# Patient Record
Sex: MALE | Race: WHITE | Employment: PART TIME | URBAN - METROPOLITAN AREA
[De-identification: names, ages, dates, MRNs, and addresses within clinical notes are randomized per-mention and may not be internally consistent; named-entity substitution may affect disease eponyms.]

---

## 2017-06-20 LAB
CREATININE, EXTERNAL: NORMAL
HBA1C MFR BLD HPLC: NORMAL %
LDL-C, EXTERNAL: NORMAL
MICROALBUMIN UR TEST STR-MCNC: 5.4 MG/DL

## 2017-08-18 ENCOUNTER — OFFICE VISIT (OUTPATIENT)
Dept: ENDOCRINOLOGY | Age: 57
End: 2017-08-18

## 2017-08-18 VITALS
BODY MASS INDEX: 33.78 KG/M2 | DIASTOLIC BLOOD PRESSURE: 86 MMHG | TEMPERATURE: 97.9 F | HEART RATE: 61 BPM | HEIGHT: 72 IN | SYSTOLIC BLOOD PRESSURE: 145 MMHG | WEIGHT: 249.4 LBS | RESPIRATION RATE: 16 BRPM | OXYGEN SATURATION: 96 %

## 2017-08-18 DIAGNOSIS — E03.9 HYPOTHYROIDISM, UNSPECIFIED TYPE: Primary | ICD-10-CM

## 2017-08-18 DIAGNOSIS — E11.9 TYPE 2 DIABETES MELLITUS WITHOUT COMPLICATION, WITHOUT LONG-TERM CURRENT USE OF INSULIN (HCC): ICD-10-CM

## 2017-08-18 DIAGNOSIS — E78.5 HYPERLIPIDEMIA, UNSPECIFIED HYPERLIPIDEMIA TYPE: ICD-10-CM

## 2017-08-18 RX ORDER — GLIPIZIDE 10 MG/1
10 TABLET ORAL
Qty: 180 TAB | Refills: 3 | Status: SHIPPED | OUTPATIENT
Start: 2017-08-18 | End: 2018-07-20 | Stop reason: SDUPTHER

## 2017-08-18 RX ORDER — LEVOTHYROXINE SODIUM 175 UG/1
175 TABLET ORAL
COMMUNITY
End: 2017-08-18 | Stop reason: SDUPTHER

## 2017-08-18 RX ORDER — METFORMIN HYDROCHLORIDE 1000 MG/1
1000 TABLET ORAL 2 TIMES DAILY WITH MEALS
COMMUNITY

## 2017-08-18 RX ORDER — LISINOPRIL AND HYDROCHLOROTHIAZIDE 12.5; 2 MG/1; MG/1
1 TABLET ORAL DAILY
COMMUNITY
End: 2021-10-01

## 2017-08-18 RX ORDER — ALBUTEROL SULFATE 90 UG/1
2 AEROSOL, METERED RESPIRATORY (INHALATION)
COMMUNITY
End: 2021-10-01

## 2017-08-18 RX ORDER — LEVOTHYROXINE SODIUM 150 UG/1
150 TABLET ORAL
Qty: 90 TAB | Refills: 3 | Status: SHIPPED | OUTPATIENT
Start: 2017-08-18 | End: 2018-06-24 | Stop reason: SDUPTHER

## 2017-08-18 NOTE — PATIENT INSTRUCTIONS
Taking Victoza:  Start by taking 0.6mg once daily for 1 week,   Then 1.2mg daily for 1 week,   Then 1.8mg daily thereafter. You can go ahead and increase to the 1.2 mg daily dose. We changed glyburide to glipizide 10mg at the start of breakfast and the start of dinner. Take your 175 mcg of levothyroxine (thyroid hormone) only 6 days per week. Once you finish it, I have ordered 150 mcg tablets and you can take those once each day. Remember to take levothyroxine with a glass of water on an empty stomach each day in the mornings, 1 hour prior to ingesting any food or other medications, including vitamins. Remember to walk for 20-30 minutes, one hour after dinner each evening. This is like taking a medicine itself. Plan to f/u in clinic in 3 months for re-evaluation. Check blood sugars at least once in the morning,    Morgan STEVE [address] [place] [address] Endocrinology  [address] [place] [address]    ----------------------------------------------------------------------------------------------------------------------    Below you will find a glucose log sheet which you can use to record your blood sugars. Without checking and recording what your home glucose levels are, it will be difficult to make any changes to your medication dose, even when significant changes may be needed. Please feel free to use the log below to record your home glucose levels. At the very least, I would like for you to login the entire 2-3 weeks just before your visit so we can make your visit much more productive and beneficial to you. GLUCOSE LOG SHEET:    Date Breakfast Lunch Dinner Bedtime Comments ? GLUCOSE LOG SHEET:    Date Breakfast Lunch Dinner Bedtime Comments ? GLUCOSE LOG SHEET:    Date Breakfast Lunch Dinner Bedtime Comments ?

## 2017-08-18 NOTE — LETTER
8/18/2017 2:22 PM 
 
Mr. Kris Rai [address] [facility] Alisha Welsh [address] [facility] 
 
Patient was seen by me in clinic today for evaluation and management of his chronic medical conditions. It is important that he is able to alter his sedentary lifestyle and not spend too much time sitting. I recommend that he try using equipment or office furniture that allows him to stand and/or walk during his daily activities/work. Sincerely, Alicia Kowalski MD

## 2017-08-18 NOTE — MR AVS SNAPSHOT
Visit Information Date & Time Provider Department Dept. Phone Encounter #  
 8/18/2017  1:30 PM Nadege Gomez, [address] [facility] Diabetes and Endocrinology [phone] [id number] Follow-up Instructions Return in about 3 months (around 11/18/2017). Upcoming Health Maintenance Date Due Hepatitis C Screening 1960 DTaP/Tdap/Td series (1 - Tdap) 6/12/1981 FOBT Q 1 YEAR AGE 50-75 6/12/2010 INFLUENZA AGE 9 TO ADULT 8/1/2017 Allergies as of 8/18/2017  Review Complete On: 8/18/2017 By: Nadege Gomez MD  
 Not on File Current Immunizations  Never Reviewed No immunizations on file. Not reviewed this visit You Were Diagnosed With   
  
 Codes Comments Hypothyroidism, unspecified type    -  Primary ICD-10-CM: E03.9 ICD-9-CM: 746. 9 Type 2 diabetes mellitus without complication, without long-term current use of insulin (HCC)     ICD-10-CM: E11.9 ICD-9-CM: 250.00 Hyperlipidemia, unspecified hyperlipidemia type     ICD-10-CM: E78.5 ICD-9-CM: 272.4 Vitals BP Pulse Temp Resp Height(growth percentile) Weight(growth percentile) 145/86 (BP 1 Location: Left arm, BP Patient Position: Sitting) 61 97.9 °F (36.6 °C) (Oral) 16 6' (1.829 m) 249 lb 6.4 oz (113.1 kg) SpO2 BMI Smoking Status 96% 33.82 kg/m2 Never Smoker Vitals History BMI and BSA Data Body Mass Index Body Surface Area  
 33.82 kg/m 2 2.4 m 2 Preferred Pharmacy Pharmacy Name Phone [address] [facility] [place], [id number] [place] [address] [address] Yamilet Naranjo 134 Your Updated Medication List  
  
   
This list is accurate as of: 8/18/17  2:22 PM.  Always use your most recent med list.  
  
  
  
  
 BD ULTRA-FINE CAROLINA PEN NEEDLES  
by Does Not Apply route. glipiZIDE 10 mg tablet Commonly known as:  Abhinav Picking Take 1 Tab by mouth Before breakfast and dinner. levothyroxine 150 mcg tablet Commonly known as:  SYNTHROID  
 Take 1 Tab by mouth Daily (before breakfast). Liraglutide 0.6 mg/0.1 mL (18 mg/3 mL) sub-q pen Commonly known as:  VICTOZA  
0.6 mg by SubCUTAneous route. lisinopril-hydroCHLOROthiazide 20-12.5 mg per tablet Commonly known as:  Terryl Range Take 1 Tab by mouth daily. metFORMIN 1,000 mg tablet Commonly known as:  GLUCOPHAGE Take 1,000 mg by mouth two (2) times daily (with meals). PROAIR HFA 90 mcg/actuation inhaler Generic drug:  albuterol Take 2 Puffs by inhalation every four (4) hours as needed for Wheezing. Prescriptions Sent to Pharmacy Refills  
 glipiZIDE (GLUCOTROL) 10 mg tablet 3 Sig: Take 1 Tab by mouth Before breakfast and dinner. Class: Normal  
 Pharmacy: [address] [place] [address] . [address] Ph #: [phone] Route: Oral  
 levothyroxine (SYNTHROID) 150 mcg tablet 3 Sig: Take 1 Tab by mouth Daily (before breakfast). Class: Normal  
 Pharmacy: [address] [place] [address] . [address] Ph #: [phone] Route: Oral  
  
We Performed the Following AMB EXT CREATININE [[id number] CPT(R)] Comments: This external order was created through the Results Console. AMB EXT HGBA1C [[id number] CPT(R)] Comments: This external order was created through the Results Console. AMB EXT LDL-C [[id number] CPT(R)] Comments: This external order was created through the Results Console. AMB EXT URINE MICROALBUMIN [[id number] CPT(R)] Comments: This external order was created through the Results Console.  DIABETES FOOT EXAM [7 Custom] Follow-up Instructions Return in about 3 months (around 11/18/2017). Patient Instructions Taking Victoza: 
Start by taking 0.6mg once daily for 1 week, Then 1.2mg daily for 1 week, Then 1.8mg daily thereafter. You can go ahead and increase to the 1.2 mg daily dose. We changed glyburide to glipizide 10mg at the start of breakfast and the start of dinner. Take your 175 mcg of levothyroxine (thyroid hormone) only 6 days per week. Once you finish it, I have ordered 150 mcg tablets and you can take those once each day. Remember to take levothyroxine with a glass of water on an empty stomach each day in the mornings, 1 hour prior to ingesting any food or other medications, including vitamins. Remember to walk for 20-30 minutes, one hour after dinner each evening. This is like taking a medicine itself. Plan to f/u in clinic in 3 months for re-evaluation. Check blood sugars at least once in the morning, Radha Dowling. [facility] Diabetes & Endocrinology [facility] Alfreda Fly 
 
---------------------------------------------------------------------------------------------------------------------- Below you will find a glucose log sheet which you can use to record your blood sugars. Without checking and recording what your home glucose levels are, it will be difficult to make any changes to your medication dose, even when significant changes may be needed. Please feel free to use the log below to record your home glucose levels. At the very least, I would like for you to login the entire 2-3 weeks just before your visit so we can make your visit much more productive and beneficial to you. GLUCOSE LOG SHEET: 
 
Date Breakfast Lunch Dinner Bedtime Comments ? GLUCOSE LOG SHEET: 
 
Date Breakfast Lunch Dinner Bedtime Comments ? GLUCOSE LOG SHEET: 
 
Date Breakfast Lunch Dinner Bedtime Comments ? Introducing [facility]! Mery Hilton introduces Played patient portal. Now you can access parts of your medical record, email your doctor's office, and request medication refills online. 1. In your internet browser, go to [email or web address]. [email or web address] Signature 2. Click on the First Time User? Click Here link in the Sign In box. You will see the New Member Sign Up page. 3. Enter your Played Access Code exactly as it appears below. You will not need to use this code after youve completed the sign-up process. If you do not sign up before the expiration date, you must request a new code. · Played Access Code: [email or web address] Expires: 11/16/2017  1:26 PM 
 
4. Enter the last four digits of your Social Security Number (xxxx) and Date of Birth (mm/dd/yyyy) as indicated and click Submit. You will be taken to the next sign-up page. 5. Create a Played ID. This will be your Played login ID and cannot be changed, so think of one that is secure and easy to remember. 6. Create a Played password. You can change your password at any time. 7. Enter your Password Reset Question and Answer. This can be used at a later time if you forget your password. 8. Enter your e-mail address. You will receive e-mail notification when new information is available in [address] 9. Click Sign Up. You can now view and download portions of your medical record. 10. Click the Download Summary menu link to download a portable copy of your medical information.  
 
If you have questions, please visit the Frequently Asked Questions section of the [employer]. Remember, [employer] is NOT to be used for urgent needs. For medical emergencies, dial 911. Now available from your iPhone and Android! Please provide this summary of care documentation to your next provider. Your primary care clinician is listed as Gurvinder Sierra. If you have any questions after today's visit, please call [phone].

## 2017-08-18 NOTE — PROGRESS NOTES
Tania Royal is a 62 y.o. male    Chief Complaint   Patient presents with    Diabetes     Pt stated that he has Type II Diabetes since several years (More than 5)     1. Have you been to the ER, urgent care clinic since your last visit? Hospitalized since your last visit? No    2. Have you seen or consulted any other health care providers outside of the [address] [place] [address] since your last visit? Include any pap smears or colon screening.  No

## 2017-08-18 NOTE — PROGRESS NOTES
CONSULTATION REQUESTED BY: Dina Parker MD     REASON FOR CONSULT:  Uncontrolled type 2 diabetes and hypothyroidism    CHIEF COMPLAINT: diabetes not at goal, hypothyroidism    HISTORY OF PRESENT ILLNESS:   Robles Mcdermott is a 62 y.o. male with a PMHx as noted below who was referred to our endocrinology clinic for evaluation of uncontrolled type 2 diabetes and hypothyroidism. Diabetes History:  Diabetes was diagnosed about 10 years ago or more. Seen nutritionist in past, has resources at home  Family History of diabetes is positive in his father. Last A1c prior to initial visit was 8.7% as of 6/20/17    Current Home Regimen:  - Victoza 0.6mg once daily  - Glyburide 5mg  - metformin 1000mg BID    Review of home glucose:  Not checking home blood sugars at home    Review of most recent diabetes-related labs:  Lab Results   Component Value Date    IOB7BOLT 8.7 % 06/20/2017    CREATEXT Cr 1.24,    GFR >60 06/20/2017    MALBEXT 5.4 06/20/2017           Hypothyroidism: Diagnosed 15 years ago, taking 175 mcg each morning with his other meds, 15 minutes before his breakfast.      PAST MEDICAL/SURGICAL HISTORY:   Past Medical History:   Diagnosis Date    Diabetes ([facility] [address]      History reviewed. No pertinent surgical history. ALLERGIES:   Not on File    MEDICATIONS ON ADMISSION:     Current Outpatient Prescriptions:     glipiZIDE (GLUCOTROL) 10 mg tablet, Take 1 Tab by mouth Before breakfast and dinner., Disp: 180 Tab, Rfl: 3    PEN NEEDLE, DIABETIC (BD ULTRA-FINE CAROLINA PEN NEEDLES), by Does Not Apply route., Disp: , Rfl:     metFORMIN (GLUCOPHAGE) 1,000 mg tablet, Take 1,000 mg by mouth two (2) times daily (with meals). , Disp: , Rfl:     lisinopril-hydroCHLOROthiazide (PRINZIDE, ZESTORETIC) 20-12.5 mg per tablet, Take 1 Tab by mouth daily. , Disp: , Rfl:     Liraglutide (VICTOZA) 0.6 mg/0.1 mL (18 mg/3 mL) sub-q pen, 0.6 mg by SubCUTAneous route., Disp: , Rfl:     albuterol (PROAIR HFA) 90 mcg/actuation inhaler, Take 2 Puffs by inhalation every four (4) hours as needed for Wheezing., Disp: , Rfl:     levothyroxine (SYNTHROID) 150 mcg tablet, Take 1 Tab by mouth Daily (before breakfast). , Disp: 80 Tab, Rfl: 3    SOCIAL HISTORY:   Social History     Social History    Marital status:      Spouse name: N/A    Number of children: N/A    Years of education: N/A     Occupational History    Not on file. Social History Main Topics    Smoking status: Never Smoker    Smokeless tobacco: Never Used    Alcohol use Yes      Comment: occasionally    Drug use: No    Sexual activity: Not on file     Other Topics Concern    Not on file     Social History Narrative    No narrative on file       FAMILY HISTORY:  Family History   Problem Relation Age of Onset    Breast Cancer Mother     Diabetes Father        REVIEW OF SYSTEMS: Complete ROS assessed and noted for that which is described above, all else are negative.   Eyes: normal  ENT: normal  CVS: normal  Resp: normal  GI: normal  : normal  GYN: normal  Endocrine: normal  Integument: normal  Musculoskeletal: normal  Neuro: normal  Psych: normal      PHYSICAL EXAMINATION:    VITAL SIGNS:  Visit Vitals    /86 (BP 1 Location: Left arm, BP Patient Position: Sitting)    Pulse 61    Temp 97.9 °F (36.6 °C) (Oral)    Resp 16    Ht 6' (1.829 m)    Wt 249 lb 6.4 oz (113.1 kg)    SpO2 96%    BMI 33.82 kg/m2       GENERAL: NCAT, Sitting comfortably, NAD  EYES: EOMI, non-icteric, no proptosis  Ear/Nose/Throat: NCAT, no inflammation, no masses  LYMPH NODES: No LAD  CARDIOVASCULAR: S1 S2, RRR, No murmur, 2+ radial pulses  RESPIRATORY: CTA b/l, no wheeze/rales  GASTROINTESTINAL: NT, ND  MUSCULOSKELETAL: Normal ROM, no atrophy  SKIN: warm, no edema/rash/ or other skin changes  NEUROLOGIC: 5/5 power all extremities, no tremor, AAOx3  PSYCHIATRIC: Normal affect, Normal insight and judgement         Diabetic foot exam performed by Tala Mcneill MD Measurement  Response Nurse Comment Physician Comment   Monofilament  R - normal sensation with micro filament  L - normal sensation with micro filament     Pulse DP R - 2+ (normal)  L - 2+ (normal)     Vibration R - Normal    L - Normal     Structural deformity R - Mild - hammertoes  L - Mild - hammertoes     Skin Integrity / Deformity R - None  L - None        Reviewed by:             REVIEW OF LABORATORY AND RADIOLOGY DATA:   Labs and documentation have been reviewed as described above. ASSESSMENT AND PLAN:   Israel Otto is a 62 y.o. male with a PMHx as noted above who was referred to our endocrinology clinic for evaluation of uncontrolled type 2 diabetes. Problems:  Type 2 diabetes Uncontrolled  Hyperlipidemia  Hypertension    We had the pleasure of reviewing together the basics of diabetes including basic pathophysiology and diabetes care. We further discussed the importance of checking home glucose regularly and taking all of their scheduled medications in order to have the best possible outcome. I was able to answer any questions they had in clinic today and they are invited to reach me if they have any further questions. Based upon our discussion together today we have decided to make the following changes:    Thyroid: We noted he was taking his levothyroxine with his other meds, and waiting only 15 minutes before breakfast and coffee. Likely this explains his high dose requirement and variable blood levels of FT4. We discussed the right way to take it, and since his TSH is already low at 0.23 we will decrease to 150mg daily (175 x6 days/week ok till he finishes current tabs). Diabetes meds: His regimen looks good but can use some modifications. He has no history of pancreatitis or or thyroid cancers, and it would be reasonable to advance his victoza.  Currently he is only on 1/3 of the potential full dose so we will go ahead and have him increase it to the full dose of 1.8mg daily as tolerated. Also concerning his sulfonylurea, glipizide has a better effect from my experience on bringing sugars down after meals, and so we will try to see how he does with this instead of glylburide. Exercise: We spent time talking about the importance of walking in the evenings. He also noted he will try getting a desk that allows him to stand while working. PLAN  Type 2 Diabetes  Medications:  Increase Victoza as directed to 1.2, then to 1.8 mg daily injections. Switch glyburide to glipizide 10mg BID with breakfast and dinner. Continue metformin 1000mg BID  Advised to check glucose at least every morning, currently not checking. Provided with glucose log sheets for later review. Labs: up to date, I will check a POC A1c on next visit  Feet: Examination performed today. Encouraged regular \"self-checks\" at home. Eyes: Advised updated eye exam report faxed to our office for review. Kidney: GFR/Urine microalbumin stable, recorded with outside labs into chart. HTN: lisinopril-HCTZ ok, monitoring  HLD: LDL above goal, discussed dietary modification, will consider statin on f/u   Hypothyroidism: See discussion above, decreased dose to 150 mcg daily. Labs: On f/u I will repeat lipids, TSH/FT4, and POC A1c    RTC: I would like to see them back in 3 months. Jovan Abad.  [address] [facility] Diabetes & Endocrinology

## 2017-11-14 LAB
CREATININE, EXTERNAL: NORMAL
HBA1C MFR BLD HPLC: NORMAL %
LDL-C, EXTERNAL: NORMAL
MICROALBUMIN UR TEST STR-MCNC: <1.7 MG/DL

## 2017-11-20 ENCOUNTER — OFFICE VISIT (OUTPATIENT)
Dept: ENDOCRINOLOGY | Age: 57
End: 2017-11-20

## 2017-11-20 VITALS
HEART RATE: 71 BPM | SYSTOLIC BLOOD PRESSURE: 136 MMHG | HEIGHT: 72 IN | DIASTOLIC BLOOD PRESSURE: 81 MMHG | BODY MASS INDEX: 33.9 KG/M2 | WEIGHT: 250.3 LBS

## 2017-11-20 DIAGNOSIS — E03.9 HYPOTHYROIDISM, UNSPECIFIED TYPE: ICD-10-CM

## 2017-11-20 DIAGNOSIS — E78.5 HYPERLIPIDEMIA, UNSPECIFIED HYPERLIPIDEMIA TYPE: ICD-10-CM

## 2017-11-20 DIAGNOSIS — E11.9 TYPE 2 DIABETES MELLITUS WITHOUT COMPLICATION, WITHOUT LONG-TERM CURRENT USE OF INSULIN (HCC): Primary | ICD-10-CM

## 2017-11-20 RX ORDER — GUAIFENESIN 100 MG/5ML
81 LIQUID (ML) ORAL DAILY
COMMUNITY
End: 2021-10-01

## 2017-11-20 NOTE — MR AVS SNAPSHOT
Visit Information Date & Time Provider Department Dept. Phone Encounter #  
 11/20/2017  4:10 PM Philip Harrell, [address] [place] [address] Diabetes and Endocrinology [phone] [id number] Follow-up Instructions Return in about 3 months (around 2/20/2018). Upcoming Health Maintenance Date Due Hepatitis C Screening 1960 DTaP/Tdap/Td series (1 - Tdap) 6/12/1981 FOBT Q 1 YEAR AGE 50-75 6/12/2010 Influenza Age 5 to Adult 8/1/2017 Allergies as of 11/20/2017  Review Complete On: 11/20/2017 By: Philip Harrell MD  
 No Known Allergies Current Immunizations  Never Reviewed No immunizations on file. Not reviewed this visit You Were Diagnosed With   
  
 Codes Comments Type 2 diabetes mellitus without complication, without long-term current use of insulin (HCC)    -  Primary ICD-10-CM: E11.9 ICD-9-CM: 250.00 Hyperlipidemia, unspecified hyperlipidemia type     ICD-10-CM: E78.5 ICD-9-CM: 272.4 Hypothyroidism, unspecified type     ICD-10-CM: E03.9 ICD-9-CM: 828. 9 Vitals BP Pulse Height(growth percentile) Weight(growth percentile) BMI Smoking Status 136/81 (BP 1 Location: Left arm, BP Patient Position: Sitting) 71 6' (1.829 m) 250 lb 4.8 oz (113.5 kg) 33.95 kg/m2 Never Smoker BMI and BSA Data Body Mass Index Body Surface Area 33.95 kg/m 2 2.4 m 2 Preferred Pharmacy Pharmacy Name Phone [id number] [address] [phone] Your Updated Medication List  
  
   
This list is accurate as of: 11/20/17  4:46 PM.  Always use your most recent med list.  
  
  
  
  
 aspirin 81 mg chewable tablet Take 81 mg by mouth daily. BD ULTRA-FINE CAROLINA PEN NEEDLES  
by Does Not Apply route. glipiZIDE 10 mg tablet Commonly known as:  Ciarra Factor Take 1 Tab by mouth Before breakfast and dinner. glucose blood VI test strips strip Commonly known as:  PHARMACIST CHOICE One Touch Ultra Test Strips; Check glucose 2 times daily, Diagnosis E11.65  
  
 levothyroxine 150 mcg tablet Commonly known as:  SYNTHROID Take 1 Tab by mouth Daily (before breakfast). Liraglutide 0.6 mg/0.1 mL (18 mg/3 mL) Pnij Commonly known as:  VICTOZA  
1.8 mg by SubCUTAneous route daily. lisinopril-hydroCHLOROthiazide 20-12.5 mg per tablet Commonly known as:  Kalyan Notice Take 1 Tab by mouth daily. metFORMIN 1,000 mg tablet Commonly known as:  GLUCOPHAGE Take 1,000 mg by mouth two (2) times daily (with meals). PROAIR HFA 90 mcg/actuation inhaler Generic drug:  albuterol Take 2 Puffs by inhalation every four (4) hours as needed for Wheezing. Prescriptions Sent to Pharmacy Refills  
 glucose blood VI test strips (PHARMACIST CHOICE) strip 3 Sig: One Touch Ultra Test Strips; Check glucose 2 times daily, Diagnosis E11.65 Class: Normal  
 Pharmacy: [address] [place] [address] [address] Ph #: [phone] Liraglutide (VICTOZA) 0.6 mg/0.1 mL (18 mg/3 mL) pnij 0 Si.8 mg by SubCUTAneous route daily. Class: Normal  
 [id number] [address] [place] [address] Ph #: [phone] Route: SubCUTAneous Follow-up Instructions Return in about 3 months (around 2018). Patient Instructions Metformin 1000mg BID Glipizide 10mg BID Victoza 1.8mg daily 
 
---------------------------------------------------------------------------------------------------------------------- Below you will find a glucose log sheet which you can use to record your blood sugars. Without checking and recording what your home glucose levels are, it will be difficult to make any changes to your medication dose, even when significant changes may be needed.  Please feel free to use the log below to record your home glucose levels. At the very least, I would like for you to login the entire 2-3 weeks just before your visit so we can make your visit much more productive and beneficial to you. GLUCOSE LOG SHEET: 
 
Date Breakfast Lunch Dinner Bedtime Comments ? GLUCOSE LOG SHEET: 
 
Date Breakfast Lunch Dinner Bedtime Comments ? GLUCOSE LOG SHEET: 
 
Date Breakfast Lunch Dinner Bedtime Comments ? Introducing [facility]! Victor Hugo Mosquera introduces Swarm patient portal. Now you can access parts of your medical record, email your doctor's office, and request medication refills online. 1. In your internet browser, go to [email or web address] 2. Click on the First Time User? Click Here link in the Sign In box. You will see the New Member Sign Up page. 3. Enter your Swarm Access Code exactly as it appears below. You will not need to use this code after youve completed the sign-up process. If you do not sign up before the expiration date, you must request a new code. · Swarm Access Code: [email or web address] Expires: 2/18/2018  4:34 PM 
 
4.  Enter the last four digits of your Social Security Number (xxxx) and Date of Birth (mm/dd/yyyy) as indicated and click Submit. You will be taken to the next sign-up page. 5. Create a Trippeo ID. This will be your Trippeo login ID and cannot be changed, so think of one that is secure and easy to remember. 6. Create a Trippeo password. You can change your password at any time. 7. Enter your Password Reset Question and Answer. This can be used at a later time if you forget your password. 8. Enter your e-mail address. You will receive e-mail notification when new information is available in [address] 9. Click Sign Up. You can now view and download portions of your medical record. 10. Click the Download Summary menu link to download a portable copy of your medical information. If you have questions, please visit the Frequently Asked Questions section of the Trip[employer]o website. Remember, [employer]peo is NOT to be used for urgent needs. For medical emergencies, dial 911. Now available from your iPhone and Android! Please provide this summary of care documentation to your next provider. Your primary care clinician is listed as 535Seamus Sierra. If you have any questions after today's visit, please call [phone].

## 2017-11-20 NOTE — PATIENT INSTRUCTIONS
Metformin 1000mg BID  Glipizide 10mg BID  Victoza 1.8mg daily    ----------------------------------------------------------------------------------------------------------------------    Below you will find a glucose log sheet which you can use to record your blood sugars. Without checking and recording what your home glucose levels are, it will be difficult to make any changes to your medication dose, even when significant changes may be needed. Please feel free to use the log below to record your home glucose levels. At the very least, I would like for you to login the entire 2-3 weeks just before your visit so we can make your visit much more productive and beneficial to you. GLUCOSE LOG SHEET:    Date Breakfast Lunch Dinner Bedtime Comments ? GLUCOSE LOG SHEET:    Date Breakfast Lunch Dinner Bedtime Comments ? GLUCOSE LOG SHEET:    Date Breakfast Lunch Dinner Bedtime Comments ?

## 2017-11-20 NOTE — PROGRESS NOTES
CHIEF COMPLAINT: f/u evaluation for uncontrolled type 2 diabetes    HISTORY OF PRESENT ILLNESS:   Eliezer Sotelo is a 62 y.o. male with a PMHx as noted below who presents to the endocrinology clinic for f/u evaluation of uncontrolled type 2 diabetes. Patient initially came with an A1c of 8.7%, we titrated up his victoza, adjusted the sulfonylurea to glipizide 10mg BID, and continued metformin. Received outside labs he completed with Dr. Ewa Barajas showing his A1c to have improved to 7.9%. He notes that he has not taken the victoza in a few days as he is still waiting on it to arrive. Currently taking the following meds:  Metformin 1000mg BID  Glipizide 10mg BID (was taking the dinner dose after the dinner)  Victoza 1.8mg daily (did not increase further due to concern of running out)    Review of home blood glucose:  AM: 120-170     Review of most recent diabetes-related labs:  Lab Results   Component Value Date    HNT7HWVE 7.9% 11/14/2017    VTA1CFQO 8.7 % 06/20/2017    LDLCEXT LDL 98 11/14/2017    CREATEXT Cr 1.2,   GFR>60 11/14/2017    MALBEXT <1.7 11/14/2017     MCACR = Urine Microalbumin      PAST MEDICAL/SURGICAL HISTORY:   Past Medical History:   Diagnosis Date    Diabetes ([facility] [address]      No past surgical history on file. ALLERGIES:   No Known Allergies    MEDICATIONS ON ADMISSION:     Current Outpatient Prescriptions:     aspirin 81 mg chewable tablet, Take 81 mg by mouth daily. , Disp: , Rfl:     glucose blood VI test strips (PHARMACIST CHOICE) strip, One Touch Ultra Test Strips; Check glucose 2 times daily, Diagnosis E11.65, Disp: 100 Strip, Rfl: 3    Liraglutide (VICTOZA) 0.6 mg/0.1 mL (18 mg/3 mL) pnij, 1.8 mg by SubCUTAneous route daily. , Disp: 3 Pen, Rfl: 0    glipiZIDE (GLUCOTROL) 10 mg tablet, Take 1 Tab by mouth Before breakfast and dinner., Disp: 180 Tab, Rfl: 3    PEN NEEDLE, DIABETIC (BD ULTRA-FINE [place] PEN NEEDLES), by Does Not Apply route., Disp: , Rfl:     metFORMIN (GLUCOPHAGE) 1,000 mg tablet, Take 1,000 mg by mouth two (2) times daily (with meals). , Disp: , Rfl:     lisinopril-hydroCHLOROthiazide (PRINZIDE, ZESTORETIC) 20-12.5 mg per tablet, Take 1 Tab by mouth daily. , Disp: , Rfl:     levothyroxine (SYNTHROID) 150 mcg tablet, Take 1 Tab by mouth Daily (before breakfast). , Disp: 90 Tab, Rfl: 3    albuterol (PROAIR HFA) 90 mcg/actuation inhaler, Take 2 Puffs by inhalation every four (4) hours as needed for Wheezing., Disp: , Rfl:     SOCIAL HISTORY:   Social History     Social History    Marital status:      Spouse name: N/A    Number of children: N/A    Years of education: N/A     Occupational History    Not on file. Social History Main Topics    Smoking status: Never Smoker    Smokeless tobacco: Never Used    Alcohol use Yes      Comment: occasionally    Drug use: No    Sexual activity: Not on file     Other Topics Concern    Not on file     Social History Narrative       FAMILY HISTORY:  Family History   Problem Relation Age of Onset    Breast Cancer Mother     Diabetes Father        REVIEW OF SYSTEMS: Complete ROS assessed and noted for that which is described above, all else are negative.   Eyes: normal  ENT: normal  CVS: normal  Resp: normal  GI: normal  : normal  GYN: normal  Endocrine: normal  Integument: normal  Musculoskeletal: normal  Neuro: normal  Psych: normal      PHYSICAL EXAMINATION:    VITAL SIGNS:  Visit Vitals    /81 (BP 1 Location: Left arm, BP Patient Position: Sitting)    Pulse 71    Ht 6' (1.829 m)    Wt 250 lb 4.8 oz (113.5 kg)    BMI 33.95 kg/m2       GENERAL: NCAT, Sitting comfortably, NAD  EYES: EOMI, non-icteric, no proptosis  Ear/Nose/Throat: NCAT, no inflammation, no masses  LYMPH NODES: No LAD  CARDIOVASCULAR: S1 S2, RRR, No murmur, 2+ radial pulses  RESPIRATORY: CTA b/l, no wheeze/rales  GASTROINTESTINAL: ND  MUSCULOSKELETAL: Normal ROM, no atrophy  SKIN: warm, no edema/rash/ or other skin changes  NEUROLOGIC: 5/5 power all extremities, no tremors, AAOx3  PSYCHIATRIC: Normal affect, Normal insight and judgement         REVIEW OF LABORATORY AND RADIOLOGY DATA:   Labs and documentation have been reviewed as described above. ASSESSMENT AND PLAN:   Miley Davis is a 62 y.o. male with a PMHx as noted above who presents to the endocrinology clinic for f/u evaluation of uncontrolled type 2 diabetes. DM2 uncontrolled  HTN  HLD    Patient showing signs of improvement. Has not yet increased to the full dose of Victoza due to concern for costs. I sent a new script at 1.8mg dose to assure supply. I also advised him to call us in a week to see if we have 1-2 sample pens we can offer him (none today unfortunately). We also discussed the role of weight loss with his A1c, he would do a bit better if he could target a 7 pound weight loss over the next 3-4 months. We discussed a next-step plan in the case that his A1c does not come closer to goal, and I shared with him a $0 dollar copay card for farxiga that we can consider for that time. Plan as follows:    DM2:  Metformin 1000mg twice daily  Glipizide 10mg BID, advised him to take the dinner dose at the start of the meal also  Victoza, increase to 1.8mg daily, sent new script  Continue to check glucose 1-2 times daily  Provided with new log sheets    HTN: BP stable stable on lisinopril-HCTZ  HLD: Improved with dietary modification. Labs: up to date    3 month f/u visit. [facility].  [address] [facility] End[facility]ology

## 2017-11-22 ENCOUNTER — DOCUMENTATION ONLY (OUTPATIENT)
Dept: ENDOCRINOLOGY | Age: 57
End: 2017-11-22

## 2018-02-28 LAB
CREATININE, EXTERNAL: NORMAL
HBA1C MFR BLD HPLC: NORMAL %
LDL-C, EXTERNAL: NORMAL
MICROALBUMIN UR TEST STR-MCNC: <2 MG/DL

## 2018-03-28 ENCOUNTER — OFFICE VISIT (OUTPATIENT)
Dept: SURGERY | Age: 58
End: 2018-03-28

## 2018-03-28 ENCOUNTER — OFFICE VISIT (OUTPATIENT)
Dept: ENDOCRINOLOGY | Age: 58
End: 2018-03-28

## 2018-03-28 VITALS
TEMPERATURE: 96.2 F | OXYGEN SATURATION: 100 % | HEART RATE: 64 BPM | WEIGHT: 250 LBS | RESPIRATION RATE: 16 BRPM | HEIGHT: 72 IN | BODY MASS INDEX: 33.86 KG/M2 | DIASTOLIC BLOOD PRESSURE: 79 MMHG | SYSTOLIC BLOOD PRESSURE: 132 MMHG

## 2018-03-28 VITALS
HEART RATE: 66 BPM | WEIGHT: 248.1 LBS | DIASTOLIC BLOOD PRESSURE: 83 MMHG | HEIGHT: 72 IN | BODY MASS INDEX: 33.61 KG/M2 | SYSTOLIC BLOOD PRESSURE: 134 MMHG

## 2018-03-28 DIAGNOSIS — E11.9 TYPE 2 DIABETES MELLITUS WITHOUT COMPLICATION, WITHOUT LONG-TERM CURRENT USE OF INSULIN (HCC): Primary | ICD-10-CM

## 2018-03-28 DIAGNOSIS — R19.03 ABDOMINAL WALL MASS OF RIGHT LOWER QUADRANT: Primary | ICD-10-CM

## 2018-03-28 DIAGNOSIS — E03.9 HYPOTHYROIDISM, UNSPECIFIED TYPE: ICD-10-CM

## 2018-03-28 DIAGNOSIS — E66.9 OBESITY (BMI 30.0-34.9): ICD-10-CM

## 2018-03-28 DIAGNOSIS — E11.9 NON-INSULIN DEPENDENT TYPE 2 DIABETES MELLITUS (HCC): ICD-10-CM

## 2018-03-28 DIAGNOSIS — E78.5 HYPERLIPIDEMIA, UNSPECIFIED HYPERLIPIDEMIA TYPE: ICD-10-CM

## 2018-03-28 NOTE — PATIENT INSTRUCTIONS
Metformin 1000mg twice daily  Glipizide 10mg BID  Victoza 1.8mg daily    Look into saxenda coverage for medical weight loss, let me know,     Shama Cortes. [address]        Please note our new policy, you must arrive to the clinic 15 minutes before your appointment time to allow enough time for proper check-in, adequate time to spend with your doctor, and also to respect the appointment time of the next patient. Not arriving 15 minutes in advance may result in having your appointment rescheduled for the next available day/time.  ----------------------------------------------------------------------------------------------------------------------    Below you will find a glucose log sheet which you can use to record your blood sugars. Without checking and recording what your home glucose levels are, it will be difficult to make any changes to your medication dose, even when significant changes may be needed. Please feel free to use the log below to record your home glucose levels. At the very least, I would like for you to login the entire 2-3 weeks just before your visit so we can make your visit much more productive and beneficial to you. GLUCOSE LOG SHEET:    Date Breakfast Lunch Dinner Bedtime Comments ? GLUCOSE LOG SHEET:    Date Breakfast Lunch Dinner Bedtime Comments ? GLUCOSE LOG SHEET:    Date Breakfast Lunch Dinner Bedtime Comments ? Dr. Sharifa Colin to basics:    When you have diabetes or pre-diabetes, as you know, your body has difficulty dealing with the sugar it absorbs from your meals. An unrelated but very relevant term you may have heard before, quantitative easing, has a new meaning: By gradually reducing the load of sugars entering the body, you ease the stress on the body and allow it to catch up with the work it must do. This in turn will allow your body to work better. On top of this, remember one point, the more sugar stress your body has, the more you enter a state of glucose toxicity and this is a state where you become even more resistant. Thats right higher sugar = more resistance, not only to your own bodies attempt to fix the problem but also resistance to your medications. This is why medications work best when a proper diet is followed. Tip 1. Dont forget the protein. When you add a portion of meat or other low carb protein food in your meal, it provides healthy calories which contribute to reducing that feeling of hunger that drives you to eat what you dont want. Tip 2. Portions are a real thing. Before you eat, stop and look at your plate/table. Count how many items have sugars/carbs in them. A sandwich (bread) ? Darl Butt ? Sweet drink ? Potatoe ? Pasta ? Rice ? You would be surprised when you become aware. Aim for a reasonable portion of carbs, and if you feel you absolutely cannot do this, at least start working toward this. 45-60 grams is usually more than enough in one meal. And YES, than includes the desert! Tip 3. Three meals per day, snack-free in between! Your body needs a break. Eating an adequate meal keeps you from getting hungry and reaching for a snack in between meals.  Recall that most of the time, diabetic patients are not treating these snacks. Dont eat dinner late at night, but rather allow more overnight fasting time for your body to recover. If you eat dinner at 8 PM, try 6 PM.  Sporadic eating is the opposite of what you need, and adjusting to a regular eating schedule such as this will not only be a great benefit to your body, but your medications will also tend to work better at keeping your diabetes under control. Tip 4. There is no best diet when it comes to weight loss. When comparing diets and outcomes, the main ingredient when searching for weight loss was calories ! Lower calories = better weight loss. Pick a healthy diet thats right for you, i.e. diabetes friendly, and evaluate your daily calorie intake. And of course this would be of no use without exercise. Calories in need calories out.

## 2018-03-28 NOTE — MR AVS SNAPSHOT
[address] [address] [phone] Patient: David Lima MRN: [id number] VEM:[id number] Visit Information Date & Time Provider Department Dept. Phone Encounter #  
 3/28/2018  2:30 PM Herlinda Fitzgerald, [address] [place] [address] Diabetes and Endocrinology [id number] Follow-up Instructions Return in about 6 months (around 9/28/2018). Follow-up and Disposition History Your Appointments 3/28/2018  4:00 PM  
New Patient with Rachel Owens MD  
[facility] Appt Note: eval small abd lump    ref by dr Annetta Vences, [address] [facility] [address] [address]  
[address] [facility], [address] [facility], [address] [place] [address] [address] [phone] Upcoming Health Maintenance Date Due Hepatitis C Screening 1960 EYE EXAM RETINAL OR DILATED Q1 6/12/1970 Pneumococcal 19-64 Medium Risk (1 of 1 - PPSV23) 6/12/1979 DTaP/Tdap/Td series (1 - Tdap) 6/12/1981 FOBT Q 1 YEAR AGE 50-75 6/12/2010 Influenza Age 5 to Adult 8/1/2017 HEMOGLOBIN A1C Q6M 5/15/2018 FOOT EXAM Q1 8/18/2018 MICROALBUMIN Q1 11/15/2018 LIPID PANEL Q1 11/15/2018 Allergies as of 3/28/2018  Review Complete On: 3/28/2018 By: Herlinda Fitzgerald MD  
 No Known Allergies Current Immunizations  Never Reviewed No immunizations on file. Not reviewed this visit You Were Diagnosed With   
  
 Codes Comments Type 2 diabetes mellitus without complication, without long-term current use of insulin (HCC)    -  Primary ICD-10-CM: E11.9 ICD-9-CM: 250.00 Hyperlipidemia, unspecified hyperlipidemia type     ICD-10-CM: E78.5 ICD-9-CM: 272.4 Hypothyroidism, unspecified type     ICD-10-CM: E03.9 ICD-9-CM: 925. 9 Vitals BP Pulse Height(growth percentile) Weight(growth percentile) BMI Smoking Status 134/83 (BP 1 Location: Left arm, BP Patient Position: Sitting) 66 6' (1.829 m) 248 lb 1.6 oz (112.5 kg) 33.65 kg/m2 Never Smoker BMI and BSA Data Body Mass Index Body Surface Area  
 33.65 kg/m 2 2.39 m 2 Preferred Pharmacy Pharmacy Name Phone [phone] [phone] Your Updated Medication List  
  
   
This list is accurate as of 3/28/18  3:16 PM.  Always use your most recent med list.  
  
  
  
  
 aspirin 81 mg chewable tablet Take 81 mg by mouth daily. BD ULTRA-FINE [place]INA PEN NEEDLES  
by Does Not Apply route. glipiZIDE 10 mg tablet Commonly known as:  [facility] Take 1 Tab by mouth Before breakfast and dinner. glucose blood VI test strips strip Commonly known as:  PHARMACIST CHOICE One Touch Ultra Test Strips; Check glucose 2 times daily, Diagnosis E11.65  
  
 levothyroxine 150 mcg tablet Commonly known as:  SYNTHROID Take 1 Tab by mouth Daily (before breakfast). Liraglutide 0.6 mg/0.1 mL (18 mg/3 mL) Pnij Commonly known as:  VICTOZA  
1.8 mg by SubCUTAneous route daily. lisinopril-hydroCHLOROthiazide 20-12.5 mg per tablet Commonly known as:  Wylene Mings Take 1 Tab by mouth daily. metFORMIN 1,000 mg tablet Commonly known as:  GLUCOPHAGE Take 1,000 mg by mouth two (2) times daily (with meals). ONE-A-DAY MEN'S PO Take  by mouth. PROAIR HFA 90 mcg/actuation inhaler Generic drug:  albuterol Take 2 Puffs by inhalation every four (4) hours as needed for Wheezing. We Performed the Following AMB EXT CREATININE [[id number] CPT(R)] Comments: This external order was created through the Results Console. AMB EXT HGBA1C [[id number] CPT(R)] Comments: This external order was created through the Results Console. AMB EXT LDL-C [[id number] CPT(R)] Comments: This external order was created through the Results Console. AMB EXT URINE MICROALBUMIN [[id number] CPT(R)] Comments: This external order was created through the Results Console. Follow-up Instructions Return in about 6 months (around 9/28/2018). Patient Instructions Metformin 1000mg twice daily Glipizide 10mg BID Victoza 1.8mg daily Look into saxenda coverage for medical weight loss, let me know, Ariane Delaneyab. [address] [facility] Diabetes & Endocrinology [address] [place] [address] Please note our new policy, you must arrive to the clinic 15 minutes before your appointment time to allow enough time for proper check-in, adequate time to spend with your doctor, and also to respect the appointment time of the next patient. Not arriving 15 minutes in advance may result in having your appointment rescheduled for the next available day/time. 
---------------------------------------------------------------------------------------------------------------------- Below you will find a glucose log sheet which you can use to record your blood sugars. Without checking and recording what your home glucose levels are, it will be difficult to make any changes to your medication dose, even when significant changes may be needed. Please feel free to use the log below to record your home glucose levels. At the very least, I would like for you to login the entire 2-3 weeks just before your visit so we can make your visit much more productive and beneficial to you. GLUCOSE LOG SHEET: 
 
Date Breakfast Lunch Dinner Bedtime Comments ? GLUCOSE LOG SHEET: 
 
Date Breakfast Lunch Dinner Bedtime Comments ? GLUCOSE LOG SHEET: 
 
Date Breakfast Lunch Dinner Bedtime Comments ? Dr. Emigdio Rose Back to basics: 
 
When you have diabetes or pre-diabetes, as you know, your body has difficulty dealing with the sugar it absorbs from your meals. An unrelated but very relevant term you may have heard before, quantitative easing, has a new meaning: By gradually reducing the load of sugars entering the body, you ease the stress on the body and allow it to catch up with the work it must do. This in turn will allow your body to work better. On top of this, remember one point, the more sugar stress your body has, the more you enter a state of glucose toxicity and this is a state where you become even more resistant. Thats right higher sugar = more resistance, not only to your own bodies attempt to fix the problem but also resistance to your medications. This is why medications work best when a proper diet is followed. Tip 1. Dont forget the protein. When you add a portion of meat or other low carb protein food in your meal, it provides healthy calories which contribute to reducing that feeling of hunger that drives you to eat what you dont want. Tip 2. Portions are a real thing. Before you eat, stop and look at your plate/table. Count how many items have sugars/carbs in them. A sandwich (bread) ? Romelle Pound ? Sweet drink ? Potatoe ? Pasta ? Rice ? You would be surprised when you become aware.  Aim for a reasonable portion of carbs, and if you feel you absolutely cannot do this, at least start working toward this. 45-60 grams is usually more than enough in one meal. And YES, than includes the desert! Tip 3. Three meals per day, snack-free in between! Your body needs a break. Eating an adequate meal keeps you from getting hungry and reaching for a snack in between meals. Recall that most of the time, diabetic patients are not treating these snacks. Dont eat dinner late at night, but rather allow more overnight fasting time for your body to recover. If you eat dinner at 8 PM, try 6 PM.  Sporadic eating is the opposite of what you need, and adjusting to a regular eating schedule such as this will not only be a great benefit to your body, but your medications will also tend to work better at keeping your diabetes under control. Tip 4. There is no best diet when it comes to weight loss. When comparing diets and outcomes, the main ingredient when searching for weight loss was calories ! Lower calories = better weight loss. Pick a healthy diet thats right for you, i.e. diabetes friendly, and evaluate your daily calorie intake. And of course this would be of no use without exercise. Calories in need calories out. Introducing [facility]! Carli Don introduces [employer] patient portal. Now you can access parts of your medical record, email your doctor's office, and request medication refills online. 1. In your internet browser, go to [email or web address] 2. Click on the First Time User? Click Here link in the Sign In box. You will see the New Member Sign Up page. 3. Enter your [employer] Access Code exactly as it appears below. You will not need to use this code after youve completed the sign-up process. If you do not sign up before the expiration date, you must request a new code. · [employer] Access Code: [email or web address] Expires: 6/26/2018  3:16 PM 
 
 4. Enter the last four digits of your Social Security Number (xxxx) and Date of Birth (mm/dd/yyyy) as indicated and click Submit. You will be taken to the next sign-up page. 5. Create a [employer] ID. This will be your [employer] login ID and cannot be changed, so think of one that is secure and easy to remember. 6. Create a [employer] password. You can change your password at any time. 7. Enter your Password Reset Question and Answer. This can be used at a later time if you forget your password. 8. Enter your e-mail address. You will receive e-mail notification when new information is available in [address] 9. Click Sign Up. You can now view and download portions of your medical record. 10. Click the Download Summary menu link to download a portable copy of your medical information. If you have questions, please visit the Frequently Asked Questions section of the [employer] website. Remember, [employer] is NOT to be used for urgent needs. For medical emergencies, dial 911. Now available from your iPhone and Android! Please provide this summary of care documentation to your next provider. Your primary care clinician is listed as Gurvinder Sierra. If you have any questions after today's visit, please call [phone].

## 2018-03-28 NOTE — MR AVS SNAPSHOT
[address], [address], [address] [place] [address] [facility] 
[phone] Patient: Billy Diaz MRN: [id number] COL:5/08/2871 Visit Information Date & Time Provider Department Dept. Phone Encounter #  
 3/28/2018  4:00 PM Jovan Fair MD [facility] [id number] Your Appointments 6/11/2018  8:00 AM  
ESTABLISHED PATIENT with Jovan Fair MD  
[facility] Dr. Yuri Cardenas ([facility]) Appt Note: 3 month follow up  
 [address], [address], [address] [address]  
[address], [address], [address] [facility] [address] [address]  
  
    
 9/28/2018  9:30 AM  
Follow Up with Eusebia Thomas MD  
[place] [facility]) Appt Note: f/u Diabetes [facility] [address] [address] [address] [place] [address] Upcoming Health Maintenance Date Due Hepatitis C Screening 1960 EYE EXAM RETINAL OR DILATED Q1 6/12/1970 Pneumococcal 19-64 Medium Risk (1 of 1 - PPSV23) 6/12/1979 DTaP/Tdap/Td series (1 - Tdap) 6/12/1981 FOBT Q 1 YEAR AGE 50-75 6/12/2010 Influenza Age 5 to Adult 8/1/2017 HEMOGLOBIN A1C Q6M 5/15/2018 FOOT EXAM Q1 8/18/2018 MICROALBUMIN Q1 11/15/2018 LIPID PANEL Q1 11/15/2018 Allergies as of 3/28/2018  Review Complete On: 3/28/2018 By: Jovan Fair MD  
 No Known Allergies Current Immunizations  Never Reviewed No immunizations on file. Not reviewed this visit You Were Diagnosed With   
  
 Codes Comments Abdominal wall mass of right lower quadrant    -  Primary ICD-10-CM: R19.03 
ICD-9-CM: 789.33 Vitals BP Pulse Temp Resp Height(growth percentile) Weight(growth percentile) 132/79 (BP 1 Location: Right arm, BP Patient Position: Sitting) 64 96.2 °F (35.7 °C) (Oral) 16 6' (1.829 m) 250 lb (113.4 kg) SpO2 BMI Smoking Status 100% 33.91 kg/m2 Never Smoker Vitals History BMI and BSA Data Body Mass Index Body Surface Area  
 33.91 kg/m 2 2.4 m 2 Preferred Pharmacy Pharmacy Name Phone [phone] [phone] Your Updated Medication List  
  
   
This list is accurate as of 3/28/18  4:48 PM.  Always use your most recent med list.  
  
  
  
  
 aspirin 81 mg chewable tablet Take 81 mg by mouth daily. BD ULTRA-FINE CAROLINA PEN NEEDLES  
by Does Not Apply route. glipiZIDE 10 mg tablet Commonly known as:  Ita Buffy Take 1 Tab by mouth Before breakfast and dinner. glucose blood VI test strips strip Commonly known as:  PHARMACIST CHOICE One Touch Ultra Test Strips; Check glucose 2 times daily, Diagnosis E11.65  
  
 levothyroxine 150 mcg tablet Commonly known as:  SYNTHROID Take 1 Tab by mouth Daily (before breakfast). Liraglutide 0.6 mg/0.1 mL (18 mg/3 mL) Pnij Commonly known as:  VICTOZA  
1.8 mg by SubCUTAneous route daily. lisinopril-hydroCHLOROthiazide 20-12.5 mg per tablet Commonly known as:  Pritchett People Take 1 Tab by mouth daily. metFORMIN 1,000 mg tablet Commonly known as:  GLUCOPHAGE Take 1,000 mg by mouth two (2) times daily (with meals). ONE-A-DAY MEN'S PO Take  by mouth. PROAIR HFA 90 mcg/actuation inhaler Generic drug:  albuterol Take 2 Puffs by inhalation every four (4) hours as needed for Wheezing. To-Do List   
 03/28/2018 Imaging:  US ABD LTD   
  
 03/29/2018 7:30 AM  
  Appointment with Patrick Bah 2 at [facility] Ultrasound ([phone]) General  NPO DIET RESTICTIONS Please be NPO (nothing by mouth) for 6- 8 hours prior to procedure. GENERAL INSTRUCTIONS 1. Bring any non [facility] facility films/reports pertaining to the area being studied with you on the day of appointment. 2. A written order with a valid diagnosis and Physicians signature is required for all scheduled tests. 3. Check in at registration 30 minutes before your appointment time unless you were instructed to do otherwise. Introducing [facility] & HEALTH SERVICES! [facility] introduces [employer] patient portal. Now you can access parts of your medical record, email your doctor's office, and request medication refills online. 1. In your internet browser, go to [email or web address]/O[employer] [email or web address] 2. Click on the First Time User? Click Here link in the Sign In box. You will see the New Member Sign Up page. 3. Enter your [employer] Access Code exactly as it appears below. You will not need to use this code after youve completed the sign-up process. If you do not sign up before the expiration date, you must request a new code. · [employer] Access Code: [email or web address] Expires: 6/26/2018  3:16 PM 
 
4. Enter the last four digits of your Social Security Number (xxxx) and Date of Birth (mm/dd/yyyy) as indicated and click Submit. You will be taken to the next sign-up page. 5. Create a [employer]st ID. This will be your [employer] login ID and cannot be changed, so think of one that is secure and easy to remember. 6. Create a [employer]st password. You can change your password at any time. 7. Enter your Password Reset Question and Answer. This can be used at a later time if you forget your password. 8. Enter your e-mail address. You will receive e-mail notification when new information is available in [address] 9. Click Sign Up. You can now view and download portions of your medical record. 10. Click the Download Summary menu link to download a portable copy of your medical information.  
 
If you have questions, please visit the Frequently Asked Questions section of the [employer]. Remember, dVisithart is NOT to be used for urgent needs. For medical emergencies, dial 911. Now available from your iPhone and Android! Please provide this summary of care documentation to your next provider. Your primary care clinician is listed as Gurvinder Sierra. If you have any questions after today's visit, please call [phone].

## 2018-03-28 NOTE — PROGRESS NOTES
Chief Complaint   Patient presents with    Mass     evaluate small abdominal lump requested to be seen by dr. Claritza Devries       1. Have you been to the ER, urgent care clinic since your last visit? Urgent care bee sting Hospitalized since your last visit? no    2. Have you seen or consulted any other health care providers outside of the [address] [place] [address] since your last visit?no  Include any pap smears or colon screening.   no

## 2018-03-28 NOTE — PROGRESS NOTES
HISTORY OF PRESENT ILLNESS  Yesenia Kelly is a 62 y.o. male who comes in for consultation by Krysten Burleson MD for an abdominal mass  HPI  He noted a lump in the right lower abdomen about 3-4 weeks ago. It is not painful and he has not had surgery in the area previously. It is not changing in size and julian snot go away when he lays down. He denies associated nausea, vomiting, diarrhea, constipation, melena, hematochezia, dysuria or hematuria. He does given Victoza shots in the area. Past Medical History:   Diagnosis Date    Diabetes [facility])      Past Surgical History:   Procedure Laterality Date    HX ORTHOPAEDIC       Family History   Problem Relation Age of Onset    Breast Cancer Mother     Diabetes Father      Social History   Substance Use Topics    Smoking status: Never Smoker    Smokeless tobacco: Never Used    Alcohol use Yes      Comment: occasionally     Current Outpatient Prescriptions   Medication Sig    MULTIVIT-MINERALS/FA/LYCOPENE (ONE-A-DAY MEN'S PO) Take  by mouth.  aspirin 81 mg chewable tablet Take 81 mg by mouth daily.  glucose blood VI test strips (PHARMACIST CHOICE) strip One Touch Ultra Test Strips; Check glucose 2 times daily, Diagnosis E11.65    Liraglutide (VICTOZA) 0.6 mg/0.1 mL (18 mg/3 mL) pnij 1.8 mg by SubCUTAneous route daily.  glipiZIDE (GLUCOTROL) 10 mg tablet Take 1 Tab by mouth Before breakfast and dinner.  PEN NEEDLE, DIABETIC (BD ULTRA-FINE CAROLINA PEN NEEDLES) by Does Not Apply route.  metFORMIN (GLUCOPHAGE) 1,000 mg tablet Take 1,000 mg by mouth two (2) times daily (with meals).  lisinopril-hydroCHLOROthiazide (PRINZIDE, ZESTORETIC) 20-12.5 mg per tablet Take 1 Tab by mouth daily.  albuterol (PROAIR HFA) 90 mcg/actuation inhaler Take 2 Puffs by inhalation every four (4) hours as needed for Wheezing.  levothyroxine (SYNTHROID) 150 mcg tablet Take 1 Tab by mouth Daily (before breakfast).      No current facility-administered medications for this visit. No Known Allergies    Review of Systems   Constitutional: Negative for chills, diaphoresis, fever, malaise/fatigue and weight loss. HENT: Negative for congestion, ear pain and sore throat. Eyes: Negative for blurred vision and pain. Respiratory: Negative for cough, hemoptysis, sputum production, shortness of breath, wheezing and stridor. Cardiovascular: Negative for chest pain, palpitations, orthopnea, claudication, leg swelling and PND. Gastrointestinal: Negative for abdominal pain, blood in stool, constipation, diarrhea, heartburn, melena, nausea and vomiting. Genitourinary: Negative for dysuria, flank pain, frequency, hematuria and urgency. Musculoskeletal: Negative for back pain, joint pain, myalgias and neck pain. Skin: Negative for itching and rash. Neurological: Negative for dizziness, tremors, focal weakness, seizures, weakness and headaches. Endo/Heme/Allergies: Negative for polydipsia. Psychiatric/Behavioral: Negative for depression and memory loss. The patient is not nervous/anxious. Visit Vitals    /79 (BP 1 Location: Right arm, BP Patient Position: Sitting)    Pulse 64    Temp 96.2 °F (35.7 °C) (Oral)    Resp 16    Ht 6' (1.829 m)    Wt 113.4 kg (250 lb)    SpO2 100%    BMI 33.91 kg/m2       Physical Exam   Constitutional: He is oriented to person, place, and time. He appears well-developed and well-nourished. No distress. HENT:   Head: Normocephalic and atraumatic. Mouth/Throat: Oropharynx is clear and moist. No oropharyngeal exudate. Eyes: Conjunctivae and EOM are normal. Pupils are equal, round, and reactive to light. No scleral icterus. Neck: Normal range of motion. Neck supple. No tracheal deviation present. No thyromegaly present. Cardiovascular: Normal rate, regular rhythm and normal heart sounds. Exam reveals no gallop and no friction rub. No murmur heard.   Pulmonary/Chest: Effort normal and breath sounds normal. No stridor. No respiratory distress. He has no wheezes. He has no rales. Abdominal: Soft. Normal appearance and bowel sounds are normal. He exhibits mass (4-5 cm soft deep subcutaneous mass in lower abdomen to right of midline). He exhibits no distension and no pulsatile liver. There is no hepatosplenomegaly. There is no tenderness. There is no rebound, no guarding and no CVA tenderness. No hernia. Hernia confirmed negative in the ventral area, confirmed negative in the right inguinal area and confirmed negative in the left inguinal area. Genitourinary: Testes normal. Cremasteric reflex is present. Circumcised. Musculoskeletal: Normal range of motion. He exhibits no edema or tenderness. Lymphadenopathy:     He has no cervical adenopathy. Right: No inguinal adenopathy present. Left: No inguinal adenopathy present. Neurological: He is alert and oriented to person, place, and time. No cranial nerve deficit. Coordination normal.   Skin: Skin is warm and dry. No rash noted. He is not diaphoretic. No erythema. Psychiatric: He has a normal mood and affect. His behavior is normal. Judgment and thought content normal.       ASSESSMENT and PLAN  1. Right lower abdominal mass feels like a lipoma or possibly an area of fat necrosis, less likely a hernia or malignancy. I explained to him about the anatomy and pathophysiology of these processes and options for close follow up, US, and excision. Risks of each were discussed. 2.  NIDDM type 2. Improved on current therapy  3. Obesity  BMI 34. Recommended exercise and weight loss  At this point he desires an US with further recommendations based upon the results.     Most likely will plan close f/u  Will call with US results    RTC 2-3 months     Angus Deshpande MD FACS

## 2018-03-28 NOTE — PROGRESS NOTES
CHIEF COMPLAINT: f/u evaluation for uncontrolled type 2 diabetes    HISTORY OF PRESENT ILLNESS:   Kadi Ribeiro is a 62 y.o. male with a PMHx as noted below who presents to the endocrinology clinic for f/u evaluation of uncontrolled type 2 diabetes. Patient initially came with an A1c of 8.7%, we titrated up his victoza, adjusted the sulfonylurea to glipizide 10mg BID, and continued metformin. His A1c continues to improve, now at 7.2%. Currently taking the following meds:  Metformin 1000mg BID  Glipizide 10mg BID   Victoza 1.8mg daily     Review of home blood glucose:  AM: [address]     Review of most recent diabetes-related labs:  Lab Results   Component Value Date    QUX3WRBR 7.2% 02/28/2018    WPE0OFRT 7.9% 11/14/2017    NLO3VRXE 8.7 % 06/20/2017    LDLCEXT ldl 113, total 190, hdl 42, trig 175 02/28/2018    CREATEXT cr 1.23, GFR 65 02/28/2018    MALBEXT <2 02/28/2018     MCACR = Urine Microalbumin      PAST MEDICAL/SURGICAL HISTORY:   Past Medical History:   Diagnosis Date    Diabetes ([facility] [address]      No past surgical history on file. ALLERGIES:   No Known Allergies    MEDICATIONS ON ADMISSION:     Current Outpatient Prescriptions:     MULTIVIT-MINERALS/FA/LYCOPENE (ONE-A-DAY MEN'S PO), Take  by mouth., Disp: , Rfl:     aspirin 81 mg chewable tablet, Take 81 mg by mouth daily. , Disp: , Rfl:     glucose blood VI test strips (PHARMACIST CHOICE) strip, One Touch Ultra Test Strips; Check glucose 2 times daily, Diagnosis E11.65, Disp: 100 Strip, Rfl: 3    Liraglutide (VICTOZA) 0.6 mg/0.1 mL (18 mg/3 mL) pnij, 1.8 mg by SubCUTAneous route daily. , Disp: 3 Pen, Rfl: 0    glipiZIDE (GLUCOTROL) 10 mg tablet, Take 1 Tab by mouth Before breakfast and dinner., Disp: 180 Tab, Rfl: 3    PEN NEEDLE, DIABETIC (BD ULTRA-FINE CAROLINA PEN NEEDLES), by Does Not Apply route., Disp: , Rfl:     metFORMIN (GLUCOPHAGE) 1,000 mg tablet, Take 1,000 mg by mouth two (2) times daily (with meals). , Disp: , Rfl:    lisinopril-hydroCHLOROthiazide (PRINZIDE, ZESTORETIC) 20-12.5 mg per tablet, Take 1 Tab by mouth daily. , Disp: , Rfl:     levothyroxine (SYNTHROID) 150 mcg tablet, Take 1 Tab by mouth Daily (before breakfast). , Disp: 90 Tab, Rfl: 3    albuterol (PROAIR HFA) 90 mcg/actuation inhaler, Take 2 Puffs by inhalation every four (4) hours as needed for Wheezing., Disp: , Rfl:     SOCIAL HISTORY:   Social History     Social History    Marital status:      Spouse name: N/A    Number of children: N/A    Years of education: N/A     Occupational History    Not on file. Social History Main Topics    Smoking status: Never Smoker    Smokeless tobacco: Never Used    Alcohol use Yes      Comment: occasionally    Drug use: No    Sexual activity: Not on file     Other Topics Concern    Not on file     Social History Narrative       FAMILY HISTORY:  Family History   Problem Relation Age of Onset    Breast Cancer Mother     Diabetes Father        REVIEW OF SYSTEMS: Complete ROS assessed and noted for that which is described above, all else are negative.   Eyes: normal  ENT: normal  CVS: normal  Resp: normal  GI: normal  : normal  GYN: normal  Endocrine: normal  Integument: normal  Musculoskeletal: normal  Neuro: normal  Psych: normal      PHYSICAL EXAMINATION:    VITAL SIGNS:  Visit Vitals    /83 (BP 1 Location: Left arm, BP Patient Position: Sitting)    Pulse 66    Ht 6' (1.829 m)    Wt 248 lb 1.6 oz (112.5 kg)    BMI 33.65 kg/m2       GENERAL: NCAT, Sitting comfortably, NAD  EYES: EOMI, non-icteric, no proptosis  Ear/Nose/Throat: NCAT, no inflammation, no masses  LYMPH NODES: No LAD  CARDIOVASCULAR: S1 S2, RRR, No murmur, 2+ radial pulses  RESPIRATORY: CTA b/l, no wheeze/rales  GASTROINTESTINAL: ND  MUSCULOSKELETAL: Normal ROM, no atrophy  SKIN: warm, no edema/rash/ or other skin changes  NEUROLOGIC: 5/5 power all extremities, no tremors, AAOx3  PSYCHIATRIC: Normal affect, Normal insight and judgement    REVIEW OF LABORATORY AND RADIOLOGY DATA:   Labs and documentation have been reviewed as described above. ASSESSMENT AND PLAN:   Rancho Lester is a 62 y.o. male with a PMHx as noted above who presents to the endocrinology clinic for f/u evaluation of uncontrolled type 2 diabetes. DM2 improving  HTN  HLD    Patient's A1c continues to improve. Would not recommend any changes at this time. His regimen is optimal for the present time. Discussed improving on lifestyle modifications particularly with the weather getting better. Discussed a 20 minute walk 1 hour after each dinner, this will make a big difference and can produce great results. Ideally to avoid bedtime hyperglycemia. DM2:  Metformin 1000mg twice daily  Glipizide 10mg BID  Victoza 1.8mg daily  Continue to check glucose once daily in the AM  Provided with new log sheets    HTN: BP stable stable on lisinopril-HCTZ  HLD: LDL up slightly as noted above, not on a statin, improved prev with dietary changes  Weight: Spent time discussing weight management. Discussed Saxenda and issues with coverage. He would like to check into it. Labs: up to date    6 month f/u visit. >25 minutes spent together with patient today of which >50% of this time was spent in counseling and coordination of care. Elise Dinh.  [address] [facility]

## 2018-03-29 ENCOUNTER — HOSPITAL ENCOUNTER (OUTPATIENT)
Dept: ULTRASOUND IMAGING | Age: 58
Discharge: HOME | End: 2018-03-29
Attending: SURGERY
Payer: COMMERCIAL

## 2018-03-29 DIAGNOSIS — R19.03 ABDOMINAL WALL MASS OF RIGHT LOWER QUADRANT: ICD-10-CM

## 2018-03-29 PROCEDURE — 76705 ECHO EXAM OF ABDOMEN: CPT

## 2018-04-04 ENCOUNTER — TELEPHONE (OUTPATIENT)
Dept: SURGERY | Age: 58
End: 2018-04-04

## 2018-06-11 RX ORDER — LIRAGLUTIDE 6 MG/ML
INJECTION SUBCUTANEOUS
Qty: 9 ML | Refills: 3 | Status: SHIPPED | OUTPATIENT
Start: 2018-06-11 | End: 2018-10-26 | Stop reason: SDUPTHER

## 2018-06-25 RX ORDER — LEVOTHYROXINE SODIUM 150 UG/1
TABLET ORAL
Qty: 90 TAB | Refills: 1 | Status: SHIPPED | OUTPATIENT
Start: 2018-06-25 | End: 2018-12-12 | Stop reason: SDUPTHER

## 2018-07-20 ENCOUNTER — OFFICE VISIT (OUTPATIENT)
Dept: SURGERY | Age: 58
End: 2018-07-20

## 2018-07-20 VITALS
SYSTOLIC BLOOD PRESSURE: 136 MMHG | WEIGHT: 247 LBS | RESPIRATION RATE: 16 BRPM | BODY MASS INDEX: 33.46 KG/M2 | HEART RATE: 68 BPM | TEMPERATURE: 97.6 F | DIASTOLIC BLOOD PRESSURE: 76 MMHG | HEIGHT: 72 IN | OXYGEN SATURATION: 98 %

## 2018-07-20 DIAGNOSIS — R19.03 ABDOMINAL WALL MASS OF RIGHT LOWER QUADRANT: Primary | ICD-10-CM

## 2018-07-20 NOTE — PROGRESS NOTES
Jose Adams is a 62 y.o. male     Chief Complaint   Patient presents with    Mass     discuss small abdominal mass. Visit Vitals    /76 (BP 1 Location: Right arm, BP Patient Position: Sitting)    Pulse 68    Temp 97.6 °F (36.4 °C) (Oral)    Resp 16    Ht 6' (1.829 m)    Wt 247 lb (112 kg)    SpO2 98%    BMI 33.5 kg/m2       Health Maintenance Due   Topic Date Due    Hepatitis C Screening  1960    EYE EXAM RETINAL OR DILATED Q1  06/12/1970    Pneumococcal 19-64 Medium Risk (1 of 1 - PPSV23) 06/12/1979    DTaP/Tdap/Td series (1 - Tdap) 06/12/1981    FOBT Q 1 YEAR AGE 50-75  06/12/2010    FOOT EXAM Q1  08/18/2018       1. Have you been to the ER, urgent care clinic since your last visit? Hospitalized since your last visit? No    2. Have you seen or consulted any other health care providers outside of the [address] [place] [address] since your last visit? Include any pap smears or colon screening.  No

## 2018-07-20 NOTE — MR AVS SNAPSHOT
[address] [facility], [address] [place] [address] 
[phone] Patient: Ritchie Coleman MRN: [id number] QIQ:2/96/5572 Visit Information Date & Time Provider Department Dept. Phone Encounter #  
 7/20/2018  8:20 AM Laura Augustin MD [facility] [id number] Your Appointments 9/28/2018  9:30 AM  
Follow Up with Daron Abbott MD  
[place] [facility] Appt Note: f/u Diabetes [address] [phone] [address] [facility] Upcoming Health Maintenance Date Due Hepatitis C Screening 1960 EYE EXAM RETINAL OR DILATED Q1 6/12/1970 Pneumococcal 19-64 Medium Risk (1 of 1 - PPSV23) 6/12/1979 DTaP/Tdap/Td series (1 - Tdap) 6/12/1981 FOBT Q 1 YEAR AGE 50-75 6/12/2010 FOOT EXAM Q1 8/18/2018 Influenza Age 5 to Adult 8/1/2018 HEMOGLOBIN A1C Q6M 9/1/2018 MICROALBUMIN Q1 2/28/2019 LIPID PANEL Q1 3/1/2019 Allergies as of 7/20/2018  Review Complete On: 7/20/2018 By: Laura Augustin MD  
 No Known Allergies Current Immunizations  Never Reviewed No immunizations on file. Not reviewed this visit You Were Diagnosed With   
  
 Codes Comments Abdominal wall mass of right lower quadrant    -  Primary ICD-10-CM: R19.03 
ICD-9-CM: 789.33 Vitals BP Pulse Temp Resp Height(growth percentile) Weight(growth percentile) 136/76 (BP 1 Location: Right arm, BP Patient Position: Sitting) 68 97.6 °F (36.4 °C) (Oral) 16 6' (1.829 m) 247 lb (112 kg) SpO2 BMI Smoking Status 98% 33.5 kg/m2 Never Smoker BMI and BSA Data Body Mass Index Body Surface Area  
 33.5 kg/m 2 2.39 m 2 Preferred Pharmacy Pharmacy Name Phone [address] [facility], [address] [place] [address] [address] Yamilet Barry Your Updated Medication List  
  
   
This list is accurate as of 7/20/18  8:33 AM.  Always use your most recent med list.  
  
  
  
  
 aspirin 81 mg chewable tablet Take 81 mg by mouth daily. BD ULTRA-FINE CAROLINA PEN NEEDLES  
by Does Not Apply route. glipiZIDE 10 mg tablet Commonly known as:  Loura Fahad Take 1 Tab by mouth Before breakfast and dinner. glucose blood VI test strips strip Commonly known as:  PHARMACIST CHOICE One Touch Ultra Test Strips; Check glucose 2 times daily, Diagnosis E11.65  
  
 levothyroxine 150 mcg tablet Commonly known as:  SYNTHROID  
TAKE 1 TABLET BY MOUTH  DAILY BEFORE BREAKFAST  
  
 lisinopril-hydroCHLOROthiazide 20-12.5 mg per tablet Commonly known as:  Anastasiia Mcardle Take 1 Tab by mouth daily. metFORMIN 1,000 mg tablet Commonly known as:  GLUCOPHAGE Take 1,000 mg by mouth two (2) times daily (with meals). ONE-A-DAY MEN'S PO Take  by mouth. PROAIR HFA 90 mcg/actuation inhaler Generic drug:  albuterol Take 2 Puffs by inhalation every four (4) hours as needed for Wheezing. VICTOZA 3-JAY JAY 0.6 mg/0.1 mL (18 mg/3 mL) Pnij Generic drug:  Liraglutide  
inject 1.8 milligram subcutaneously daily Introducing [facility]! Dear Terrell Earl: 
Thank you for requesting a [employer] account. Our records indicate that you already have an active [employer] account. You can access your account anytime at [email or web address] Did you know that you can access your hospital and ER discharge instructions at any time in [employer]? You can also review all of your test results from your hospital stay or ER visit. Additional Information If you have questions, please visit the Frequently Asked Questions section of the [employer] website at [email or web address] Remember, MyChart is NOT to be used for urgent needs. For medical emergencies, dial 911. Now available from your iPhone and Android! Please provide this summary of care documentation to your next provider. Your primary care clinician is listed as Gurvinder Sierra. If you have any questions after today's visit, please call [phone].

## 2018-07-20 NOTE — PROGRESS NOTES
HISTORY OF PRESENT ILLNESS  Jose Adams is a 62 y.o. male who comes in for reevaluation by Andria Gomez MD for an abdominal mass  Mass   Pertinent negatives include no chest pain, no abdominal pain, no headaches and no shortness of breath. He noted a lump in the right lower abdomen about 3-4 weeks ago. It is not painful and he has not had surgery in the area previously. It is not changing in size and julian snot go away when he lays down. He denies associated nausea, vomiting, diarrhea, constipation, melena, hematochezia, dysuria or hematuria. He does given Victoza shots in the area. I saw him in the spring and an US was negative. It is bothering him more at this time. Past Medical History:   Diagnosis Date    Diabetes [facility])      Past Surgical History:   Procedure Laterality Date    HX ORTHOPAEDIC       Family History   Problem Relation Age of Onset    Breast Cancer Mother     Diabetes Father      Social History   Substance Use Topics    Smoking status: Never Smoker    Smokeless tobacco: Never Used    Alcohol use Yes      Comment: occasionally     Current Outpatient Prescriptions   Medication Sig    levothyroxine (SYNTHROID) 150 mcg tablet TAKE 1 TABLET BY MOUTH  DAILY BEFORE BREAKFAST    VICTOZA 3-JAY JAY 0.6 mg/0.1 mL (18 mg/3 mL) pnij inject 1.8 milligram subcutaneously daily    MULTIVIT-MINERALS/FA/LYCOPENE (ONE-A-DAY MEN'S PO) Take  by mouth.  aspirin 81 mg chewable tablet Take 81 mg by mouth daily.  glucose blood VI test strips (PHARMACIST CHOICE) strip One Touch Ultra Test Strips; Check glucose 2 times daily, Diagnosis E11.65    glipiZIDE (GLUCOTROL) 10 mg tablet Take 1 Tab by mouth Before breakfast and dinner.  PEN NEEDLE, DIABETIC (BD ULTRA-FINE [place] PEN NEEDLES) by Does Not Apply route.  metFORMIN (GLUCOPHAGE) 1,000 mg tablet Take 1,000 mg by mouth two (2) times daily (with meals).     lisinopril-hydroCHLOROthiazide (PRINZIDE, ZESTORETIC) 20-12.5 mg per tablet Take 1 Tab by mouth daily.  albuterol (PROAIR HFA) 90 mcg/actuation inhaler Take 2 Puffs by inhalation every four (4) hours as needed for Wheezing. No current facility-administered medications for this visit. No Known Allergies    Review of Systems   Constitutional: Negative for chills, diaphoresis, fever, malaise/fatigue and weight loss. HENT: Negative for congestion, ear pain and sore throat. Eyes: Negative for blurred vision and pain. Respiratory: Negative for cough, hemoptysis, sputum production, shortness of breath, wheezing and stridor. Cardiovascular: Negative for chest pain, palpitations, orthopnea, claudication, leg swelling and PND. Gastrointestinal: Negative for abdominal pain, blood in stool, constipation, diarrhea, heartburn, melena, nausea and vomiting. Genitourinary: Negative for dysuria, flank pain, frequency, hematuria and urgency. Musculoskeletal: Negative for back pain, joint pain, myalgias and neck pain. Skin: Negative for itching and rash. Neurological: Negative for dizziness, tremors, focal weakness, seizures, weakness and headaches. Endo/Heme/Allergies: Negative for polydipsia. Psychiatric/Behavioral: Negative for depression and memory loss. The patient is not nervous/anxious. Visit Vitals    /76 (BP 1 Location: Right arm, BP Patient Position: Sitting)    Pulse 68    Temp 97.6 °F (36.4 °C) (Oral)    Resp 16    Ht 6' (1.829 m)    Wt 112 kg (247 lb)    SpO2 98%    BMI 33.5 kg/m2       Physical Exam   Constitutional: He is oriented to person, place, and time. He appears well-developed and well-nourished. No distress. HENT:   Head: Normocephalic and atraumatic. Mouth/Throat: Oropharynx is clear and moist. No oropharyngeal exudate. Eyes: Conjunctivae and EOM are normal. Pupils are equal, round, and reactive to light. No scleral icterus. Neck: Normal range of motion. Neck supple. No tracheal deviation present. No thyromegaly present. Cardiovascular: Normal rate, regular rhythm and normal heart sounds. Exam reveals no gallop and no friction rub. No murmur heard. Pulmonary/Chest: Effort normal and breath sounds normal. No stridor. No respiratory distress. He has no wheezes. He has no rales. Abdominal: Soft. Normal appearance and bowel sounds are normal. He exhibits mass (4-5 cm soft deep subcutaneous mass in lower abdomen to right of midline). He exhibits no distension and no pulsatile liver. There is no hepatosplenomegaly. There is no tenderness. There is no rebound, no guarding and no CVA tenderness. No hernia. Hernia confirmed negative in the ventral area, confirmed negative in the right inguinal area and confirmed negative in the left inguinal area. Genitourinary: Testes normal. Cremasteric reflex is present. Circumcised. Musculoskeletal: Normal range of motion. He exhibits no edema or tenderness. Lymphadenopathy:     He has no cervical adenopathy. Right: No inguinal adenopathy present. Left: No inguinal adenopathy present. Neurological: He is alert and oriented to person, place, and time. No cranial nerve deficit. Coordination normal.   Skin: Skin is warm and dry. No rash noted. He is not diaphoretic. No erythema. Psychiatric: He has a normal mood and affect. His behavior is normal. Judgment and thought content normal.       ASSESSMENT and PLAN  1. Right lower abdominal mass feels like a lipoma or possibly an area of fat necrosis, less likely a hernia or malignancy. I explained to him about the anatomy and pathophysiology of these processes and options for close follow up, US, and excision. Risks of each were discussed. 2.  NIDDM type 2. Improved on current therapy  3. Obesity  BMI 34.    Recommended exercise and weight loss  At this point he desires excision of an abdominal wall mass under MAC as an outpatient         Dwayne Joseph MD FACS

## 2018-07-23 RX ORDER — GLIPIZIDE 10 MG/1
TABLET ORAL
Qty: 180 TAB | Refills: 4 | Status: SHIPPED | OUTPATIENT
Start: 2018-07-23 | End: 2019-08-24 | Stop reason: SDUPTHER

## 2018-08-28 ENCOUNTER — APPOINTMENT (OUTPATIENT)
Dept: GENERAL RADIOLOGY | Age: 58
End: 2018-08-28
Attending: EMERGENCY MEDICINE
Payer: COMMERCIAL

## 2018-08-28 ENCOUNTER — HOSPITAL ENCOUNTER (EMERGENCY)
Age: 58
Discharge: HOME | End: 2018-08-28
Attending: EMERGENCY MEDICINE
Payer: COMMERCIAL

## 2018-08-28 VITALS
BODY MASS INDEX: 37.3 KG/M2 | OXYGEN SATURATION: 100 % | DIASTOLIC BLOOD PRESSURE: 67 MMHG | HEIGHT: 72 IN | RESPIRATION RATE: 18 BRPM | WEIGHT: 275.35 LBS | SYSTOLIC BLOOD PRESSURE: 134 MMHG | HEART RATE: 67 BPM | TEMPERATURE: 98.6 F

## 2018-08-28 DIAGNOSIS — S76.111A RUPTURE OF RIGHT QUADRICEPS TENDON, INITIAL ENCOUNTER: Primary | ICD-10-CM

## 2018-08-28 PROCEDURE — 74011250637 HC RX REV CODE- 250/637: Performed by: EMERGENCY MEDICINE

## 2018-08-28 PROCEDURE — L1830 KO IMMOB CANVAS LONG PRE OTS: HCPCS

## 2018-08-28 PROCEDURE — 99283 EMERGENCY DEPT VISIT LOW MDM: CPT

## 2018-08-28 PROCEDURE — 96374 THER/PROPH/DIAG INJ IV PUSH: CPT

## 2018-08-28 PROCEDURE — 74011250636 HC RX REV CODE- 250/636: Performed by: EMERGENCY MEDICINE

## 2018-08-28 PROCEDURE — 73562 X-RAY EXAM OF KNEE 3: CPT

## 2018-08-28 RX ORDER — HYDROCODONE BITARTRATE AND ACETAMINOPHEN 7.5; 325 MG/1; MG/1
1 TABLET ORAL
Status: COMPLETED | OUTPATIENT
Start: 2018-08-28 | End: 2018-08-28

## 2018-08-28 RX ORDER — FENTANYL CITRATE 50 UG/ML
50 INJECTION, SOLUTION INTRAMUSCULAR; INTRAVENOUS
Status: COMPLETED | OUTPATIENT
Start: 2018-08-28 | End: 2018-08-28

## 2018-08-28 RX ORDER — SODIUM CHLORIDE 0.9 % (FLUSH) 0.9 %
5-10 SYRINGE (ML) INJECTION AS NEEDED
Status: DISCONTINUED | OUTPATIENT
Start: 2018-08-28 | End: 2018-08-29 | Stop reason: HOSPADM

## 2018-08-28 RX ORDER — HYDROCODONE BITARTRATE AND ACETAMINOPHEN 5; 325 MG/1; MG/1
1 TABLET ORAL
Qty: 20 TAB | Refills: 0 | Status: SHIPPED | OUTPATIENT
Start: 2018-08-28 | End: 2018-09-28 | Stop reason: ALTCHOICE

## 2018-08-28 RX ORDER — SODIUM CHLORIDE 0.9 % (FLUSH) 0.9 %
5-10 SYRINGE (ML) INJECTION EVERY 8 HOURS
Status: DISCONTINUED | OUTPATIENT
Start: 2018-08-28 | End: 2018-08-29 | Stop reason: HOSPADM

## 2018-08-28 RX ADMIN — HYDROCODONE BITARTRATE AND ACETAMINOPHEN 1 TABLET: 7.5; 325 TABLET ORAL at 22:10

## 2018-08-28 RX ADMIN — FENTANYL CITRATE 50 MCG: 50 INJECTION, SOLUTION INTRAMUSCULAR; INTRAVENOUS at 20:38

## 2018-08-28 NOTE — LETTER
[facility] EMERGENCY DEPT 
[address] [place] [address] [address] [id number] 
[phone] Work/School Note Date: 8/28/2018 To Whom It May concern: 
 
Magdalena Logan was seen and treated today in the emergency room by the following provider(s): 
Attending Provider: Devonte Cruz MD. Magdalena Doreen No work till 9/2/18 Sincerely, Devonte Cruz MD

## 2018-08-29 NOTE — DISCHARGE INSTRUCTIONS
[employer] Activation    Thank you for requesting access to [facility]. Please follow the instructions below to securely access and download your online medical record. [facility] allows you to send messages to your doctor, view your test results, renew your prescriptions, schedule appointments, and more. How Do I Sign Up? 1. In your internet browser, go to [email or web address]  2. Click on the First Time User? Click Here link in the Sign In box. You will be redirect to the New Member Sign Up page. 3. Enter your [facility] Access Code exactly as it appears below. You will not need to use this code after youve completed the sign-up process. If you do not sign up before the expiration date, you must request a new code. [facility] Access Code: Activation code not generated  Current [facility] Status: Active (This is the date your [facility] access code will )    4. Enter the last four digits of your Social Security Number (xxxx) and Date of Birth (mm/dd/yyyy) as indicated and click Submit. You will be taken to the next sign-up page. 5. Create a [facility] ID. This will be your [facility] login ID and cannot be changed, so think of one that is secure and easy to remember. 6. Create a [facility] password. You can change your password at any time. 7. Enter your Password Reset Question and Answer. This can be used at a later time if you forget your password. 8. Enter your e-mail address. You will receive e-mail notification when new information is available in [address] 9. Click Sign Up. You can now view and download portions of your medical record. 10. Click the Download Summary menu link to download a portable copy of your medical information. Additional Information    If you have questions, please visit the Frequently Asked Questions section of the [facility] website at [email or web address] Remember, [facility] is NOT to be used for urgent needs. For medical emergencies, dial 911.

## 2018-08-29 NOTE — ED NOTES
imobilizer placed on right knee, discussed use of imobilizer and crutches and patient is able to return demonstrate. All discharge paperwork reviewed with patient and MD and he denies any need for further explanation regarding these instructions. Denies need for wheelchair and ambulates out of ED with crutches

## 2018-08-29 NOTE — ED PROVIDER NOTES
EMERGENCY DEPARTMENT HISTORY AND PHYSICAL EXAM 
 
 
Date: 8/28/2018 Patient Name: Miley Davis History of Presenting Illness Chief Complaint Patient presents with  Fall  
  right knee deformity History Provided By: Patient and EMS 
 
HPI: Miley Davis, 62 y.o. male with PMHx significant for DM, DJD, presents via EMS to the ED with cc of a 3/10, R knee pain without movement PTA today. EMS reports an associated symptom of significantly worsening R knee swelling. Pt states he was attempted to move furniture and was ambulating down the steps, was 2 steps away from the ground, when he had slipped and fallen. He notes his pain is mild when not moving, but increases significantly with movement. Pt is a diabetic and had last eaten at 6 PM. His blood sugar was 156. EMS denies medicating the pt prior to arrival, due to his minimal pain when stationary. Pt denies a h/o HTN. He denies any tobacco or alcohol use. Pt denies any hip pain. There are no other complaints, changes, or physical findings at this time. PCP: Oscar Warner MD 
 
Current Facility-Administered Medications Medication Dose Route Frequency Provider Last Rate Last Dose  sodium chloride (NS) flush 5-10 mL  5-10 mL IntraVENous Q8H Rodrick Grajeda MD      
 sodium chloride (NS) flush 5-10 mL  5-10 mL IntraVENous PRN Radha Jacobson MD      
 
Current Outpatient Prescriptions Medication Sig Dispense Refill  
 HYDROcodone-acetaminophen (NORCO) 5-325 mg per tablet Take 1 Tab by mouth every four (4) hours as needed for Pain. Max Daily Amount: 6 Tabs. 20 Tab 0  
 glipiZIDE (GLUCOTROL) 10 mg tablet TAKE 1 TABLET  BY MOUTH  BEFORE BREAKFAST AND DINNER 180 Tab 4  
 levothyroxine (SYNTHROID) 150 mcg tablet TAKE 1 TABLET BY MOUTH  DAILY BEFORE BREAKFAST 90 Tab 1  VICTOZA 3-JAY JAY 0.6 mg/0.1 mL (18 mg/3 mL) pnij inject 1.8 milligram subcutaneously daily 9 mL 3  
  MULTIVIT-MINERALS/FA/LYCOPENE (ONE-A-DAY MEN'S PO) Take  by mouth.  aspirin 81 mg chewable tablet Take 81 mg by mouth daily.  glucose blood VI test strips (PHARMACIST CHOICE) strip One Touch Ultra Test Strips; Check glucose 2 times daily, Diagnosis E11.65 100 Strip 3  
 PEN NEEDLE, DIABETIC (BD ULTRA-FINE [place] PEN NEEDLES) by Does Not Apply route.  metFORMIN (GLUCOPHAGE) 1,000 mg tablet Take 1,000 mg by mouth two (2) times daily (with meals).  lisinopril-hydroCHLOROthiazide (PRINZIDE, ZESTORETIC) 20-12.5 mg per tablet Take 1 Tab by mouth daily.  albuterol (PROAIR HFA) 90 mcg/actuation inhaler Take 2 Puffs by inhalation every four (4) hours as needed for Wheezing. Past History Past Medical History: 
Past Medical History:  
Diagnosis Date  Diabetes ([facility] [address] Past Surgical History: 
Past Surgical History:  
Procedure Laterality Date  HX ORTHOPAEDIC Family History: 
Family History Problem Relation Age of Onset  Breast Cancer Mother  Diabetes Father Social History: 
Social History Substance Use Topics  Smoking status: Never Smoker  Smokeless tobacco: Never Used  Alcohol use Yes Comment: occasionally Allergies: 
No Known Allergies Review of Systems Review of Systems Constitutional: Negative. Negative for chills and fever. HENT: Negative. Negative for congestion, rhinorrhea and sinus pressure. Eyes: Negative. Negative for discharge and redness. Respiratory: Negative. Negative for chest tightness and shortness of breath. Cardiovascular: Negative. Negative for chest pain. Gastrointestinal: Negative. Negative for abdominal pain and blood in stool. Endocrine: Negative. Genitourinary: Negative. Negative for flank pain and hematuria. Musculoskeletal: Positive for arthralgias (+R knee, -hip) and joint swelling (+R knee). Negative for back pain. Skin: Negative. Negative for rash. Neurological: Negative. Negative for dizziness, seizures, weakness, numbness and headaches. Hematological: Negative. All other systems reviewed and are negative. Physical Exam  
Physical Exam  
Constitutional: He is oriented to person, place, and time. He appears well-developed and well-nourished. No distress. HENT:  
Head: Normocephalic and atraumatic. Nose: Nose normal.  
Mouth/Throat: No oropharyngeal exudate. Eyes: Conjunctivae and EOM are normal. Pupils are equal, round, and reactive to light. No scleral icterus. Neck: Normal range of motion. Neck supple. No JVD present. No thyromegaly present. Cardiovascular: Normal rate, regular rhythm, normal heart sounds, intact distal pulses and normal pulses. PMI is not displaced. Exam reveals no gallop and no friction rub. No murmur heard. Pulses: 
     Dorsalis pedis pulses are 2+ on the right side, and 2+ on the left side. Pulmonary/Chest: Effort normal and breath sounds normal. No stridor. No respiratory distress. He has no decreased breath sounds. He has no wheezes. He has no rhonchi. He has no rales. He exhibits no tenderness. Abdominal: Soft. Normal aorta and bowel sounds are normal. He exhibits no distension, no abdominal bruit and no mass. There is no hepatosplenomegaly. There is no tenderness. There is no rebound, no guarding and no CVA tenderness. No hernia. Musculoskeletal: He exhibits tenderness and deformity. Right knee: He exhibits decreased range of motion, swelling and abnormal patellar mobility. He exhibits no effusion, no deformity, no laceration, normal meniscus and no MCL laxity. Tenderness found. Legs: 
Palpable defect insertion of quadriceps tendon to patella Patient unable to extend Neurological: He is alert and oriented to person, place, and time. He has normal reflexes. He displays no atrophy and no tremor. No cranial nerve deficit or sensory deficit. He exhibits normal muscle tone.  He displays no seizure activity. Coordination normal. GCS eye subscore is 4. GCS verbal subscore is 5. GCS motor subscore is 6. Reflex Scores: 
     Patellar reflexes are 2+ on the right side and 2+ on the left side. Skin: Skin is warm. No rash noted. He is not diaphoretic. No erythema. Nursing note and vitals reviewed. Diagnostic Study Results Labs - No results found for this or any previous visit (from the past 12 hour(s)). Radiologic Studies -  
XR KNEE LT 3 V Final Result Initial Result:  
  Impression:  
  IMPRESSION:   
1. No acute fracture. 
 
 
  
  Narrative:  
  EXAM:  XR KNEE LT 3 V 
 
INDICATION:   knee injury probable superior patella tendon rupture. COMPARISON: None. FINDINGS: Three views of the left knee demonstrate normal alignment and no 
fracture. There is chronic calcification in the region of the distal quadriceps 
as well as the origin of the patellar tendon. Mild tricompartmental spurring. There is no effusion. Medical Decision Making I am the first provider for this patient. I reviewed the vital signs, available nursing notes, past medical history, past surgical history, family history and social history. Vital Signs-Reviewed the patient's vital signs. Patient Vitals for the past 12 hrs: 
 Temp Pulse Resp BP SpO2  
08/28/18 2100 - - - 128/65 97 % 08/28/18 2000 - - - 145/82 -  
08/28/18 1959 98.6 °F (37 °C) 67 18 145/82 99 % Pulse Oximetry Analysis - 96% on RA Cardiac Monitor:  
Rate: 70 bpm 
Rhythm: Normal Sinus Rhythm Records Reviewed: Nursing Notes, Old Medical Records, Ambulance Run Sheet, Previous Radiology Studies and Previous Laboratory Studies Provider Notes (Medical Decision Making): DDx: Knee dislocation, patella tendon disruption, knee fracture, ligamentous meniscus injury Impression/Plan: Acute injury to R knee coming down stairs. Has small effusion, clinical suspicion for patella tendon disruption. Unable to extend his leg. ED Course:  
Initial assessment performed. The patients presenting problems have been discussed, and they are in agreement with the care plan formulated and outlined with them. I have encouraged them to ask questions as they arise throughout their visit. PROGRESS NOTE: 
9:49 PM 
[facility]. CONSULT NOTE: 
9:53 PM 
Shankar Wei MD spoke with URSULA Garcia and Dr. Nitza Fuller, Specialty: Orthopedics Discussed patient's hx, disposition, and available diagnostic and imaging results. Reviewed care plans. Consultant agrees with plans as outlined. They recommended an immobilizer, weight bearing, and f/u with Dr. Nina Lujan. Critical Care Time:  
0 Disposition: 
DISCHARGE NOTE 
9:53 PM 
The patient has been re-evaluated and is ready for discharge. Reviewed available results with patient. Counseled patient on diagnosis and care plan. Patient has expressed understanding, and all questions have been answered. Patient agrees with plan and agrees to follow up as recommended, or return to the ED if their symptoms worsen. Discharge instructions have been provided and explained to the patient, along with reasons to return to the ED. PLAN: 
1. Discharge Current Discharge Medication List  
  
START taking these medications Details HYDROcodone-acetaminophen (NORCO) 5-325 mg per tablet Take 1 Tab by mouth every four (4) hours as needed for Pain. Max Daily Amount: 6 Tabs. Qty: 20 Tab, Refills: 0 Associated Diagnoses: Rupture of right quadriceps tendon, initial encounter 2. Follow-up Information Follow up With Details Comments Contact Info Rossy Rowe MD Schedule an appointment as soon as possible for a visit in 1 day  [address] [facility] [facility] 
[phone] [facility] [place] [facility]  If symptoms worsen [address] [facility] 
[phone] Return to ED if worse Diagnosis Clinical Impression: 1. Rupture of right quadriceps tendon, initial encounter Attestations: This note is prepared by Lyndsey English, acting as Scribe for Santino Grayson MD. Santino Grayson MD: The scribe's documentation has been prepared under my direction and personally reviewed by me in its entirety. I confirm that the note above accurately reflects all work, treatment, procedures, and medical decision making performed by me.

## 2018-09-28 ENCOUNTER — OFFICE VISIT (OUTPATIENT)
Dept: ENDOCRINOLOGY | Age: 58
End: 2018-09-28

## 2018-09-28 VITALS
HEART RATE: 72 BPM | HEIGHT: 72 IN | DIASTOLIC BLOOD PRESSURE: 69 MMHG | BODY MASS INDEX: 32.43 KG/M2 | SYSTOLIC BLOOD PRESSURE: 102 MMHG | WEIGHT: 239.4 LBS

## 2018-09-28 DIAGNOSIS — E11.9 TYPE 2 DIABETES MELLITUS WITHOUT COMPLICATION, WITHOUT LONG-TERM CURRENT USE OF INSULIN (HCC): Primary | ICD-10-CM

## 2018-09-28 DIAGNOSIS — E03.9 HYPOTHYROIDISM, UNSPECIFIED TYPE: ICD-10-CM

## 2018-09-28 DIAGNOSIS — E78.5 HYPERLIPIDEMIA, UNSPECIFIED HYPERLIPIDEMIA TYPE: ICD-10-CM

## 2018-09-28 LAB — HBA1C MFR BLD HPLC: 7.2 %

## 2018-09-28 RX ORDER — ASPIRIN 325 MG
325 TABLET ORAL DAILY
COMMUNITY
End: 2019-09-26 | Stop reason: ALTCHOICE

## 2018-09-28 NOTE — PATIENT INSTRUCTIONS
Metformin 1000mg twice daily Glipizide 10mg before breakfast and before dinner Victoza 1.8mg daily Plan to complete blood work at the lab 2 days before your next visit in 6 months,  
 
Shelley ZAMAN. [address] [facility] [address] [place] [address] Please note our new policy, you must arrive to the clinic 15 minutes before your appointment time to allow enough time for proper check-in, adequate time to spend with your doctor, and also to respect the appointment time of the next patient. Not arriving 15 minutes in advance may result in having your appointment rescheduled for the next available day/time. 
---------------------------------------------------------------------------------------------------------------------- Below you will find a glucose log sheet which you can use to record your blood sugars. Without checking and recording what your home glucose levels are, it will be difficult to make any changes to your medication dose, even when significant changes may be needed. Please feel free to use the log below to record your home glucose levels. At the very least, I would like for you to login the entire 2-3 weeks just before your visit so we can make your visit much more productive and beneficial to you. GLUCOSE LOG SHEET: 
 
Date Breakfast Lunch Dinner Bedtime Comments ? GLUCOSE LOG SHEET: 
 
Date Breakfast Lunch Dinner Bedtime Comments ? GLUCOSE LOG SHEET: 
 
 Date Breakfast Lunch Dinner Bedtime Comments ?

## 2018-09-28 NOTE — MR AVS SNAPSHOT
[address] [facility] [address] [address] [phone] Patient: Juana Contreras MRN: [id number] PLQ:[id number] Visit Information Date & Time Provider Department Dept. Phone Encounter #  
 9/28/2018  9:30 AM Mahesh Herrera, [address] [place] [address] Diabetes and Endocrinology [phone] [id number] Follow-up Instructions Return in about 6 months (around 3/28/2019). Upcoming Health Maintenance Date Due Hepatitis C Screening 1960 EYE EXAM RETINAL OR DILATED Q1 6/12/1970 Pneumococcal 19-64 Medium Risk (1 of 1 - PPSV23) 6/12/1979 DTaP/Tdap/Td series (1 - Tdap) 6/12/1981 Shingrix Vaccine Age 50> (1 of 2) 6/12/2010 FOBT Q 1 YEAR AGE 50-75 6/12/2010 Influenza Age 5 to Adult 8/1/2018 FOOT EXAM Q1 8/18/2018 HEMOGLOBIN A1C Q6M 9/1/2018 MICROALBUMIN Q1 2/28/2019 LIPID PANEL Q1 3/1/2019 Allergies as of 9/28/2018  Review Complete On: 9/28/2018 By: Mahesh Herrera MD  
 No Known Allergies Current Immunizations  Never Reviewed No immunizations on file. Not reviewed this visit You Were Diagnosed With   
  
 Codes Comments Type 2 diabetes mellitus without complication, without long-term current use of insulin (HCC)    -  Primary ICD-10-CM: E11.9 ICD-9-CM: 250.00 Hyperlipidemia, unspecified hyperlipidemia type     ICD-10-CM: E78.5 ICD-9-CM: 272.4 Hypothyroidism, unspecified type     ICD-10-CM: E03.9 ICD-9-CM: 566. 9 Vitals BP Pulse Height(growth percentile) Weight(growth percentile) BMI Smoking Status 102/69 (BP 1 Location: Left arm, BP Patient Position: Sitting) 72 6' (1.829 m) 239 lb 6.4 oz (108.6 kg) 32.47 kg/m2 Never Smoker BMI and BSA Data Body Mass Index Body Surface Area  
 32.47 kg/m 2 2.35 m 2 Preferred Pharmacy Pharmacy Name Phone 305 [facility], [address] [address] [place] [address] [address] Your Updated Medication List  
  
   
This list is accurate as of 9/28/18  9:45 AM.  Always use your most recent med list.  
  
  
  
  
 * aspirin 81 mg chewable tablet Take 81 mg by mouth daily. * aspirin 325 mg tablet Commonly known as:  ASPIRIN Take 325 mg by mouth daily. BD ULTRA-FINE [place] PEN NEEDLES  
by Does Not Apply route. glipiZIDE 10 mg tablet Commonly known as:  GLUCOTROL  
TAKE 1 TABLET  BY MOUTH  BEFORE BREAKFAST AND DINNER  
  
 levothyroxine 150 mcg tablet Commonly known as:  SYNTHROID  
TAKE 1 TABLET BY MOUTH  DAILY BEFORE BREAKFAST  
  
 lisinopril-hydroCHLOROthiazide 20-12.5 mg per tablet Commonly known as:  Natividad Sher Take 1 Tab by mouth daily. metFORMIN 1,000 mg tablet Commonly known as:  GLUCOPHAGE Take 1,000 mg by mouth two (2) times daily (with meals). ONE-A-DAY MEN'S PO Take 1 Tab by mouth daily. ONETOUCH ULTRA BLUE TEST STRIP strip Generic drug:  glucose blood VI test strips CHECK GLUCOSE 2 TIMES DAILY PROAIR HFA 90 mcg/actuation inhaler Generic drug:  albuterol Take 2 Puffs by inhalation every four (4) hours as needed for Wheezing. VICTOZA 3-JAY JAY 0.6 mg/0.1 mL (18 mg/3 mL) Pnij Generic drug:  Liraglutide  
inject 1.8 milligram subcutaneously daily * Notice: This list has 2 medication(s) that are the same as other medications prescribed for you. Read the directions carefully, and ask your doctor or other care provider to review them with you. We Performed the Following AMB POC HEMOGLOBIN A1C [67172 CPT(R)] HEMOGLOBIN A1C WITH EAG [41246 CPT(R)] LIPID PANEL [38911 CPT(R)] METABOLIC PANEL, COMPREHENSIVE [59629 CPT(R)] MICROALBUMIN, UR, RAND W/ MICROALB/CREAT RATIO [id number] CPT(R)] Follow-up Instructions Return in about 6 months (around 3/28/2019). Patient Instructions Metformin 1000mg twice daily Glipizide 10mg before breakfast and before dinner Victoza 1.8mg daily Plan to complete blood work at the lab 2 days before your next visit in 6 months,  
 
Pilar Mccauley T. [address] [facility] [address] Please note our new policy, you must arrive to the clinic 15 minutes before your appointment time to allow enough time for proper check-in, adequate time to spend with your doctor, and also to respect the appointment time of the next patient. Not arriving 15 minutes in advance may result in having your appointment rescheduled for the next available day/time. 
---------------------------------------------------------------------------------------------------------------------- Below you will find a glucose log sheet which you can use to record your blood sugars. Without checking and recording what your home glucose levels are, it will be difficult to make any changes to your medication dose, even when significant changes may be needed. Please feel free to use the log below to record your home glucose levels. At the very least, I would like for you to login the entire 2-3 weeks just before your visit so we can make your visit much more productive and beneficial to you. GLUCOSE LOG SHEET: 
 
Date Breakfast Lunch Dinner Bedtime Comments ? GLUCOSE LOG SHEET: 
 
Date Breakfast Lunch Dinner Bedtime Comments ? GLUCOSE LOG SHEET: 
 
Date Breakfast Lunch Dinner Bedtime Comments ? Introducing [facility]! Dear Chato Monique: 
Thank you for requesting a [employer] account. Our records indicate that you already have an active [employer] account. You can access your account anytime at [email or web address] Did you know that you can access your hospital and ER discharge instructions at any time in [employer]? You can also review all of your test results from your hospital stay or ER visit. Additional Information If you have questions, please visit the Frequently Asked Questions section of the [employer] website at [email or web address] Remember, [employer] is NOT to be used for urgent needs. For medical emergencies, dial 911. Now available from your iPhone and Android! Please provide this summary of care documentation to your next provider. Your primary care clinician is listed as Gurvinder Sierra. If you have any questions after today's visit, please call [phone].

## 2018-09-28 NOTE — PROGRESS NOTES
CHIEF COMPLAINT: f/u evaluation for uncontrolled type 2 diabetes HISTORY OF PRESENT ILLNESS:  
Rickie Clay is a 62 y.o. male with a PMHx as noted below who presents to the endocrinology clinic for f/u evaluation of uncontrolled type 2 diabetes. A1c today is notably 7.2%. Patient has not been checking his blood sugars over the past month. Currently taking the following meds: 
Metformin 1000mg BID Glipizide 10mg BID Victoza 1.8mg daily Review of home blood glucose: 
130-160 average per manual review of glucometer Review of most recent diabetes-related labs: 
Lab Results Component Value Date FWI0MCSP 7.2% 02/28/2018 GDU1RITZ 7.9% 11/14/2017 ULU4FUDT 8.7 % 06/20/2017 LDLCEXT ldl 113, total 190, hdl 42, trig 175 02/28/2018 CREATEXT cr 1.23, GFR 65 02/28/2018 MALBEXT <2 02/28/2018 MCACR = Urine Microalbumin PAST MEDICAL/SURGICAL HISTORY:  
Past Medical History:  
Diagnosis Date  Diabetes ([facility] [address]  Hypertension  Hypothyroid Past Surgical History:  
Procedure Laterality Date  HX HEENT    
 dental implant  HX ORTHOPAEDIC Right 2012  
 achilles repair ALLERGIES:  
No Known Allergies MEDICATIONS ON ADMISSION:  
 
Current Outpatient Prescriptions:  
  aspirin (ASPIRIN) 325 mg tablet, Take 325 mg by mouth daily. , Disp: , Rfl:  
  ONETOUCH ULTRA BLUE TEST STRIP strip, CHECK GLUCOSE 2 TIMES DAILY, Disp: 200 Strip, Rfl: 3 
  glipiZIDE (GLUCOTROL) 10 mg tablet, TAKE 1 TABLET  BY MOUTH  BEFORE BREAKFAST AND DINNER, Disp: 180 Tab, Rfl: 4 
  levothyroxine (SYNTHROID) 150 mcg tablet, TAKE 1 TABLET BY MOUTH  DAILY BEFORE BREAKFAST, Disp: 90 Tab, Rfl: 1   VICTOZA 3-JAY JAY 0.6 mg/0.1 mL (18 mg/3 mL) pnij, inject 1.8 milligram subcutaneously daily, Disp: 9 mL, Rfl: 3 
  MULTIVIT-MINERALS/FA/LYCOPENE (ONE-A-DAY MEN'S PO), Take 1 Tab by mouth daily. , Disp: , Rfl:  
  PEN NEEDLE, DIABETIC (BD ULTRA-FINE [place] PEN NEEDLES), by Does Not Apply route., Disp: , Rfl:  
  metFORMIN (GLUCOPHAGE) 1,000 mg tablet, Take 1,000 mg by mouth two (2) times daily (with meals). , Disp: , Rfl:  
  lisinopril-hydroCHLOROthiazide (PRINZIDE, ZESTORETIC) 20-12.5 mg per tablet, Take 1 Tab by mouth daily. , Disp: , Rfl:  
  aspirin 81 mg chewable tablet, Take 81 mg by mouth daily. , Disp: , Rfl:  
  albuterol (PROAIR HFA) 90 mcg/actuation inhaler, Take 2 Puffs by inhalation every four (4) hours as needed for Wheezing., Disp: , Rfl:  
 
SOCIAL HISTORY:  
Social History Social History  Marital status:  Spouse name: N/A  
 Number of children: N/A  
 Years of education: N/A Occupational History  Not on file. Social History Main Topics  Smoking status: Never Smoker  Smokeless tobacco: Never Used  Alcohol use 1.2 oz/week 1 Glasses of wine, 1 Cans of beer per week  Drug use: No  
 Sexual activity: Not on file Other Topics Concern  Not on file Social History Narrative FAMILY HISTORY: 
Family History Problem Relation Age of Onset  Breast Cancer Mother  Diabetes Father REVIEW OF SYSTEMS: Complete ROS assessed and noted for that which is described above, all else are negative. Eyes: normal 
ENT: normal 
CVS: normal 
Resp: normal 
GI: normal 
: normal 
GYN: normal 
Endocrine: normal 
Integument: normal 
Musculoskeletal: normal 
Neuro: normal 
Psych: normal 
 
 
PHYSICAL EXAMINATION: 
 
VITAL SIGNS: 
Visit Vitals  /69 (BP 1 Location: Left arm, BP Patient Position: Sitting)  Pulse 72  Ht 6' (1.829 m)  Wt 239 lb 6.4 oz (108.6 kg)  BMI 32.47 kg/m2 GENERAL: NCAT, Sitting comfortably, NAD 
EYES: EOMI, non-icteric, no proptosis Ear/Nose/Throat: NCAT, no inflammation, no masses LYMPH NODES: No LAD CARDIOVASCULAR: S1 S2, RRR, No murmur, 2+ radial pulses RESPIRATORY: CTA b/l, no wheeze/rales GASTROINTESTINAL: ND 
MUSCULOSKELETAL: Normal ROM, no atrophy SKIN: warm, no edema/rash/ or other skin changes NEUROLOGIC: 5/5 power all extremities, no tremors, AAOx3 PSYCHIATRIC: Normal affect, Normal insight and judgement Diabetic foot exam:  
 
Left Foot: 
 Visual Exam: normal  
 Pulse DP: 2+ (normal) Filament test: normal sensation Vibratory sensation: Vibratory sensation: normal  
   
Right Foot: 
 Visual Exam: normal  
 Pulse DP: 2+ (normal) Filament test: normal sensation Vibratory sensation: Vibratory sensation: normal 
 
 
 
REVIEW OF LABORATORY AND RADIOLOGY DATA:  
Labs and documentation have been reviewed as described above. ASSESSMENT AND PLAN:  
Demetri Pelletier is a 62 y.o. male with a PMHx as noted above who presents to the endocrinology clinic for f/u evaluation of uncontrolled type 2 diabetes. DM2 improving HTN 
HLD Discussed importance for continued dietary changes. He is not ambulating well currently with the brace on his right, leg and the lack of exercise can cause his sugars to rise so we discussed keeping an eye on the eating habits. This is true through the holidays as well. We completed his diabetes foot examination today which was stable without significant findings. He will be due for his annual diabetes blood work on follow up, I provided him with the order slip. DM2: 
Metformin 1000mg twice daily Glipizide 10mg BID Victoza 1.8mg daily DM foot examination completed today HTN: BP stable stable on lisinopril-HCTZ 
HLD: dietary efforts, repeat LDL with next visit (prelabs ordered) Labs: prelabs ordered for next visit. 6 month f/u visit. >25 minutes spent together with patient today of which >50% of this time was spent in counseling and coordination of care. Rohini Pelayo. [address] [facility]

## 2018-12-12 RX ORDER — LEVOTHYROXINE SODIUM 150 UG/1
TABLET ORAL
Qty: 90 TAB | Refills: 1 | Status: SHIPPED | OUTPATIENT
Start: 2018-12-12 | End: 2019-06-18 | Stop reason: SDUPTHER

## 2019-03-07 LAB
CREATININE, EXTERNAL: 1.22
HBA1C MFR BLD HPLC: 7.8 %
LDL-C, EXTERNAL: 122
MICROALBUMIN UR TEST STR-MCNC: 2 MG/DL

## 2019-03-28 ENCOUNTER — OFFICE VISIT (OUTPATIENT)
Dept: ENDOCRINOLOGY | Age: 59
End: 2019-03-28

## 2019-03-28 VITALS
BODY MASS INDEX: 33.46 KG/M2 | DIASTOLIC BLOOD PRESSURE: 69 MMHG | SYSTOLIC BLOOD PRESSURE: 130 MMHG | HEIGHT: 72 IN | WEIGHT: 247 LBS | HEART RATE: 66 BPM

## 2019-03-28 DIAGNOSIS — E03.9 HYPOTHYROIDISM, UNSPECIFIED TYPE: ICD-10-CM

## 2019-03-28 DIAGNOSIS — E11.9 TYPE 2 DIABETES MELLITUS WITHOUT COMPLICATION, WITHOUT LONG-TERM CURRENT USE OF INSULIN (HCC): Primary | ICD-10-CM

## 2019-03-28 DIAGNOSIS — E78.5 HYPERLIPIDEMIA, UNSPECIFIED HYPERLIPIDEMIA TYPE: ICD-10-CM

## 2019-03-28 RX ORDER — RIVAROXABAN 10 MG/1
10 TABLET, FILM COATED ORAL
COMMUNITY
Start: 2018-09-04 | End: 2019-03-28 | Stop reason: CLARIF

## 2019-03-28 RX ORDER — MELOXICAM 15 MG/1
15 TABLET ORAL
COMMUNITY
Start: 2019-02-02 | End: 2021-10-18 | Stop reason: ALTCHOICE

## 2019-03-28 NOTE — PROGRESS NOTES
CHIEF COMPLAINT: f/u evaluation for uncontrolled type 2 diabetes    HISTORY OF PRESENT ILLNESS:   Casey Karimi is a 62 y.o. male with a PMHx as noted below who presents to the endocrinology clinic for f/u evaluation of uncontrolled type 2 diabetes. A1c recently is notably 7.8%, previously 7.2%. He has also gained 8 pounds which may have contributed to the increasing resistance. Currently taking the following meds:  Metformin 1000mg BID  Glipizide 10mg BID   Victoza 1.8mg daily     Review of home blood glucose:  No log or meter with him today,   AM: 150 average reported,    Review of most recent diabetes-related labs:  Lab Results   Component Value Date    JWV2HZUI A1c 7.8% 03/07/2019    WOA8TZYM 7.2% 02/28/2018    QEN1YYRJ 7.9% 11/14/2017    ZYV5JPSA 7.2 09/28/2018    LDLCEXT , Total Chol 194 03/07/2019    CREATEXT Cr 1.22, GFR >60 03/07/2019    MALBEXT 2  ug/mg 03/07/2019     MCACR = Urine Microalbumin      PAST MEDICAL/SURGICAL HISTORY:   Past Medical History:   Diagnosis Date    Diabetes ([facility] [address]     Hypertension     Hypothyroid      Past Surgical History:   Procedure Laterality Date    HX HEENT      dental implant    HX ORTHOPAEDIC Right 2012    achilles repair       ALLERGIES:   No Known Allergies    MEDICATIONS ON ADMISSION:     Current Outpatient Medications:     meloxicam (MOBIC) 15 mg tablet, , Disp: , Rfl:     levothyroxine (SYNTHROID) 150 mcg tablet, TAKE 1 TABLET BY MOUTH  DAILY BEFORE BREAKFAST, Disp: 90 Tab, Rfl: 1    VICTOZA 3-JAY JAY 0.6 mg/0.1 mL (18 mg/3 mL) pnij, inject 1.8 milligram dose subcutaneously daily, Disp: 9 Pen, Rfl: 3    ONETOUCH ULTRA BLUE TEST STRIP strip, CHECK GLUCOSE 2 TIMES DAILY, Disp: 200 Strip, Rfl: 3    glipiZIDE (GLUCOTROL) 10 mg tablet, TAKE 1 TABLET  BY MOUTH  BEFORE BREAKFAST AND DINNER, Disp: 180 Tab, Rfl: 4    MULTIVIT-MINERALS/FA/LYCOPENE (ONE-A-DAY MEN'S PO), Take 1 Tab by mouth daily. , Disp: , Rfl:     aspirin 81 mg chewable tablet, Take 81 mg by mouth daily. , Disp: , Rfl:     PEN NEEDLE, DIABETIC (BD ULTRA-FINE [place] PEN NEEDLES), by Does Not Apply route., Disp: , Rfl:     metFORMIN (GLUCOPHAGE) 1,000 mg tablet, Take 1,000 mg by mouth two (2) times daily (with meals). , Disp: , Rfl:     lisinopril-hydroCHLOROthiazide (PRINZIDE, ZESTORETIC) 20-12.5 mg per tablet, Take 1 Tab by mouth daily. , Disp: , Rfl:     aspirin (ASPIRIN) 325 mg tablet, Take 325 mg by mouth daily. , Disp: , Rfl:     albuterol (PROAIR HFA) 90 mcg/actuation inhaler, Take 2 Puffs by inhalation every four (4) hours as needed for Wheezing., Disp: , Rfl:     SOCIAL HISTORY:   Social History     Socioeconomic History    Marital status:      Spouse name: Not on file    Number of children: Not on file    Years of education: Not on file    Highest education level: Not on file   Occupational History    Not on file   Social Needs    Financial resource strain: Not on file    Food insecurity:     Worry: Not on file     Inability: Not on file    Transportation needs:     Medical: Not on file     Non-medical: Not on file   Tobacco Use    Smoking status: Never Smoker    Smokeless tobacco: Never Used   Substance and Sexual Activity    Alcohol use:  Yes     Alcohol/week: 1.2 oz     Types: 1 Glasses of wine, 1 Cans of beer per week    Drug use: No    Sexual activity: Not on file   Lifestyle    Physical activity:     Days per week: Not on file     Minutes per session: Not on file    Stress: Not on file   Relationships    Social connections:     Talks on phone: Not on file     Gets together: Not on file     Attends Taoist service: Not on file     Active member of club or organization: Not on file     Attends meetings of clubs or organizations: Not on file     Relationship status: Not on file    Intimate partner violence:     Fear of current or ex partner: Not on file     Emotionally abused: Not on file     Physically abused: Not on file     Forced sexual activity: Not on file Other Topics Concern    Not on file   Social History Narrative    Not on file       FAMILY HISTORY:  Family History   Problem Relation Age of Onset    Breast Cancer Mother     Diabetes Father        REVIEW OF SYSTEMS: Complete ROS assessed and noted for that which is described above, all else are negative. Eyes: normal  ENT: normal  CVS: normal  Resp: normal  GI: normal  : normal  GYN: normal  Endocrine: normal  Integument: normal  Musculoskeletal: normal  Neuro: normal  Psych: normal      PHYSICAL EXAMINATION:    VITAL SIGNS:  Visit Vitals  /69 (BP 1 Location: Left arm, BP Patient Position: Sitting)   Pulse 66   Ht 6' (1.829 m)   Wt 247 lb (112 kg)   BMI 33.50 kg/m²       GENERAL: NCAT, Sitting comfortably, NAD  EYES: EOMI, non-icteric, no proptosis  Ear/Nose/Throat: NCAT, no inflammation, no masses  LYMPH NODES: No LAD  CARDIOVASCULAR: S1 S2, RRR, No murmur, 2+ radial pulses  RESPIRATORY: CTA b/l, no wheeze/rales  GASTROINTESTINAL: ND  MUSCULOSKELETAL: Normal ROM, no atrophy  SKIN: warm, no edema/rash/ or other skin changes  NEUROLOGIC: 5/5 power all extremities, no tremors, AAOx3  PSYCHIATRIC: Normal affect, Normal insight and judgement    REVIEW OF LABORATORY AND RADIOLOGY DATA:   Labs and documentation have been reviewed as described above. ASSESSMENT AND PLAN:   Bishop Esposito is a 62 y.o. male with a PMHx as noted above who presents to the endocrinology clinic for f/u evaluation of uncontrolled type 2 diabetes. DM2  HTN  HLD  hypothyroidism    Post prandial hyperglycemia. Today we discussed using an SGLT-2 inhibitor. We discussed the mechanism of action for this medication in the kidney. We also discussed the rare but possible adverse effects which include acidosis and yeast infections etc however I believe that the benefit of better diabetes control far outweighs the risks of treating with this class of medications.  The patient agrees to a trial which I think will produce good results and contribute to the reduction of their cardiovascular risks from uncontrolled diabetes. DM2:  Start Jardiance 25mg each morning before breakfast, $0 copay card provided  Metformin 1000mg twice daily  Glipizide 10mg before breakfast and dinner  Victoza 1.8mg daily before breakfast    HTN: BP stable stable on lisinopril-HCTZ  HLD: LDL still elevated, he is not ready for a statin, advised he let me know if and when. Hypothyroidism: on 150 mcg once daily: outside labs 3/7/19 TSH 0.63, FT4 1.34, stable    Labs: up to date    6 month f/u visit    Perfecto STEVE  [address] [facility]

## 2019-03-28 NOTE — PATIENT INSTRUCTIONS
Start Jardiance 25mg each morning before breakfast, $0 copay card provided   Stay hydrated with fluids    Metformin 1000mg twice daily    Glipizide 10mg before breakfast and dinner    Victoza 1.8mg daily before breakfast      See you back in in 6 months,      Neeru STEVE  [address] [facility]  [address] [place] [address]

## 2019-08-26 RX ORDER — GLIPIZIDE 10 MG/1
TABLET ORAL
Qty: 180 TAB | Refills: 4 | Status: SHIPPED | OUTPATIENT
Start: 2019-08-26 | End: 2020-11-24

## 2019-09-26 ENCOUNTER — OFFICE VISIT (OUTPATIENT)
Dept: ENDOCRINOLOGY | Age: 59
End: 2019-09-26

## 2019-09-26 VITALS
DIASTOLIC BLOOD PRESSURE: 69 MMHG | HEIGHT: 72 IN | WEIGHT: 232 LBS | HEART RATE: 77 BPM | BODY MASS INDEX: 31.42 KG/M2 | SYSTOLIC BLOOD PRESSURE: 105 MMHG

## 2019-09-26 DIAGNOSIS — E78.5 HYPERLIPIDEMIA, UNSPECIFIED HYPERLIPIDEMIA TYPE: ICD-10-CM

## 2019-09-26 DIAGNOSIS — E11.9 TYPE 2 DIABETES MELLITUS WITHOUT COMPLICATION, WITHOUT LONG-TERM CURRENT USE OF INSULIN (HCC): Primary | ICD-10-CM

## 2019-09-26 DIAGNOSIS — E03.9 HYPOTHYROIDISM, UNSPECIFIED TYPE: ICD-10-CM

## 2019-09-26 RX ORDER — LORATADINE 10 MG/1
10 TABLET ORAL
COMMUNITY

## 2019-09-26 NOTE — PATIENT INSTRUCTIONS
Jardiance 25mg each morning before breakfast    Metformin 1000mg twice daily    Glipizide 10mg before breakfast and dinner    Victoza 1.8mg daily before breakfast      Please note our new policy, you must arrive to the clinic 15 minutes before your appointment time to allow enough time for proper check-in, adequate time to spend with your doctor, and also to respect the appointment time of the next patient. Not arriving 15 minutes in advance may result in having your appointment rescheduled for the next available day/time.  ----------------------------------------------------------------------------------------------------------------------    Below you will find a glucose log sheet which you can use to record your blood sugars. Without checking and recording what your home glucose levels are, it will be difficult to make any changes to your medication dose, even when significant changes may be needed. Please feel free to use the log below to record your home glucose levels. At the very least, I would like for you to login the entire 2-3 weeks just before your visit so we can make your visit much more productive and beneficial to you. GLUCOSE LOG SHEET:    Date Breakfast Lunch Dinner Bedtime Comments ? GLUCOSE LOG SHEET:    Date Breakfast Lunch Dinner Bedtime Comments ? GLUCOSE LOG SHEET:    Date Breakfast Lunch Dinner Bedtime Comments ?

## 2019-09-26 NOTE — PROGRESS NOTES
CHIEF COMPLAINT: f/u evaluation for uncontrolled type 2 diabetes    HISTORY OF PRESENT ILLNESS:   Cata Landaverde is a 61 y.o. male with a PMHx as noted below who presents to the endocrinology clinic for f/u evaluation of uncontrolled type 2 diabetes. A1c recently is notably 7.4%    Currently taking the following meds:  Jardiance 25mg each morning before breakfast  Metformin 1000mg twice daily  Glipizide 10mg before breakfast and dinner  Victoza 1.8mg daily before breakfast    Review of home blood glucose:  Average AM sugar 134, just a few numbers above goal,  Had two courses of prednisone, each 6 days, one month ago, + steroid cream. Likely this caused his A1c to be higher than expected. Review of most recent diabetes-related labs:  Lab Results   Component Value Date    [id number] A1c 7.8% 03/07/2019    EDI3YFTK 7.2% 02/28/2018    HKF9TZTA 7.9% 11/14/2017    DBK3ORRU 7.2 09/28/2018    LDLCEXT , Total Chol 194 03/07/2019    CREATEXT Cr 1.22, GFR >60 03/07/2019    MALBEXT 2  ug/mg 03/07/2019    MALBEXT 0.7 03/07/2019     MCACR = Urine Microalbumin    PAST MEDICAL/SURGICAL HISTORY:   Past Medical History:   Diagnosis Date    Diabetes ([facility] [address]     Hypertension     Hypothyroid      Past Surgical History:   Procedure Laterality Date    HX HEENT      dental implant    HX ORTHOPAEDIC Right 2012    achilles repair       ALLERGIES:   No Known Allergies    MEDICATIONS ON ADMISSION:     Current Outpatient Medications:     loratadine (CLARITIN) 10 mg tablet, Take 10 mg by mouth., Disp: , Rfl:     glipiZIDE (GLUCOTROL) 10 mg tablet, TAKE 1 TABLET  BY MOUTH  BEFORE BREAKFAST AND DINNER, Disp: 180 Tab, Rfl: 4    levothyroxine (SYNTHROID) 150 mcg tablet, TAKE 1 TABLET BY MOUTH  DAILY BEFORE BREAKFAST, Disp: 90 Tab, Rfl: 3    liraglutide (VICTOZA 3-JAY JAY) 0.6 mg/0.1 mL (18 mg/3 mL) pnij, 1.8 mg by SubCUTAneous route Daily (before breakfast). , Disp: 9 Pen, Rfl: 3    meloxicam (MOBIC) 15 mg tablet, , Disp: , Rfl:   empagliflozin (JARDIANCE) 25 mg tablet, Take 1 Tab by mouth Daily (before breakfast). , Disp: 90 Tab, Rfl: 3    ONETOUCH ULTRA BLUE TEST STRIP strip, CHECK GLUCOSE 2 TIMES DAILY, Disp: 200 Strip, Rfl: 3    metFORMIN (GLUCOPHAGE) 1,000 mg tablet, Take 1,000 mg by mouth two (2) times daily (with meals). , Disp: , Rfl:     lisinopril-hydroCHLOROthiazide (PRINZIDE, ZESTORETIC) 20-12.5 mg per tablet, Take 1 Tab by mouth daily. , Disp: , Rfl:     MULTIVIT-MINERALS/FA/LYCOPENE (ONE-A-DAY MEN'S PO), Take 1 Tab by mouth daily. , Disp: , Rfl:     aspirin 81 mg chewable tablet, Take 81 mg by mouth daily. , Disp: , Rfl:     PEN NEEDLE, DIABETIC (BD ULTRA-FINE [place] PEN NEEDLES), by Does Not Apply route., Disp: , Rfl:     albuterol (PROAIR HFA) 90 mcg/actuation inhaler, Take 2 Puffs by inhalation every four (4) hours as needed for Wheezing., Disp: , Rfl:     SOCIAL HISTORY:   Social History     Socioeconomic History    Marital status:      Spouse name: Not on file    Number of children: Not on file    Years of education: Not on file    Highest education level: Not on file   Occupational History    Not on file   Social Needs    Financial resource strain: Not on file    Food insecurity:     Worry: Not on file     Inability: Not on file    Transportation needs:     Medical: Not on file     Non-medical: Not on file   Tobacco Use    Smoking status: Never Smoker    Smokeless tobacco: Never Used   Substance and Sexual Activity    Alcohol use:  Yes     Alcohol/week: 2.0 standard drinks     Types: 1 Glasses of wine, 1 Cans of beer per week    Drug use: No    Sexual activity: Not on file   Lifestyle    Physical activity:     Days per week: Not on file     Minutes per session: Not on file    Stress: Not on file   Relationships    Social connections:     Talks on phone: Not on file     Gets together: Not on file     Attends Moravian service: Not on file     Active member of club or organization: Not on file Attends meetings of clubs or organizations: Not on file     Relationship status: Not on file    Intimate partner violence:     Fear of current or ex partner: Not on file     Emotionally abused: Not on file     Physically abused: Not on file     Forced sexual activity: Not on file   Other Topics Concern    Not on file   Social History Narrative    Not on file       FAMILY HISTORY:  Family History   Problem Relation Age of Onset    Breast Cancer Mother     Diabetes Father        REVIEW OF SYSTEMS: Complete ROS assessed and noted for that which is described above, all else are negative. Eyes: normal  ENT: normal  CVS: normal  Resp: normal  GI: normal  : normal  GYN: normal  Endocrine: normal  Integument: normal  Musculoskeletal: normal  Neuro: normal  Psych: normal      PHYSICAL EXAMINATION:    VITAL SIGNS:  Visit Vitals  /69 (BP 1 Location: Left arm, BP Patient Position: Sitting)   Pulse 77   Ht 6' (1.829 m)   Wt 232 lb (105.2 kg)   BMI 31.46 kg/m²       GENERAL: NCAT, Sitting comfortably, NAD  EYES: EOMI, non-icteric, no proptosis  Ear/Nose/Throat: NCAT, no inflammation, no masses  LYMPH NODES: No LAD  CARDIOVASCULAR: S1 S2, RRR, No murmur, 2+ radial pulses  RESPIRATORY: CTA b/l, no wheeze/rales  GASTROINTESTINAL: ND  MUSCULOSKELETAL: Normal ROM, no atrophy  SKIN: warm, no edema/rash/ or other skin changes  NEUROLOGIC: 5/5 power all extremities, no tremors, AAOx3  PSYCHIATRIC: Normal affect, Normal insight and judgement    Diabetic foot exam:     Left Foot:   Visual Exam: normal    Pulse DP: 2+ (normal)   Filament test: normal sensation    Vibratory sensation: Vibratory sensation: normal       Right Foot:   Visual Exam: normal    Pulse DP: 2+ (normal)   Filament test: normal sensation    Vibratory sensation: Vibratory sensation: normal      REVIEW OF LABORATORY AND RADIOLOGY DATA:   Labs and documentation have been reviewed as described above. ASSESSMENT AND PLAN:   Verona Jeffery is a 61 y.o. male with a PMHx as noted above who presents to the endocrinology clinic for f/u evaluation of uncontrolled type 2 diabetes. DM2  HTN  HLD  hypothyroidism    Had two courses of prednisone, each 6 days, one month ago, + steroid cream. Likely this caused his A1c to be higher than expected. Otherwise his A1c currently is actually expected to be closer to a 6.5%. We will continue as follows:     DM2:  Jardiance 25mg each morning before breakfast  Metformin 1000mg twice daily  Glipizide 10mg before breakfast and dinner  Victoza 1.8mg daily before breakfast    HTN: BP stable stable on lisinopril-HCTZ  HLD: LDL elevated, he is not interested in a statin, advised he let me know if and when. Hypothyroidism: on 150 mcg once daily: outside labs 3/7/19 TSH 0.63, FT4 1.34, stable    Labs: will see PCP in Dec, advised will need 2020 labs we can consider on next visit ( to include his TSH ). He may need to find out what his preferred lab is due to having had a high copay with an outside clinic in 2019.     6 month f/u visit    Pinky STEVE  [address] [facility]

## 2019-09-27 LAB — HBA1C MFR BLD HPLC: 7.4 %

## 2019-12-19 ENCOUNTER — DOCUMENTATION ONLY (OUTPATIENT)
Dept: ENDOCRINOLOGY | Age: 59
End: 2019-12-19

## 2020-03-13 ENCOUNTER — TELEPHONE (OUTPATIENT)
Dept: ENDOCRINOLOGY | Age: 60
End: 2020-03-13

## 2020-03-13 RX ORDER — EMPAGLIFLOZIN 25 MG/1
TABLET, FILM COATED ORAL
Qty: 90 TAB | Refills: 3 | Status: SHIPPED | OUTPATIENT
Start: 2020-03-13 | End: 2021-02-25 | Stop reason: SDUPTHER

## 2020-03-13 NOTE — TELEPHONE ENCOUNTER
----- Message from Aby Taylor sent at 3/13/2020  3:55 PM EDT -----  Regarding: Dr Michaela Goodwin  General Message/Vendor Calls    Caller's first and last name:      Reason for call: Pt is reporting that [facility] has cancelled his Rx for the Pen Needles and diabetic test strips due to no response from the practice regarding a Rx renewal      Callback required yes/no and why:yes       Best contact number(s):[phone]      Details to clarify the request:      Aby Taylor

## 2020-03-16 RX ORDER — PEN NEEDLE, DIABETIC 31 GX3/16"
NEEDLE, DISPOSABLE MISCELLANEOUS
Qty: 100 PEN NEEDLE | Refills: 3 | Status: SHIPPED | OUTPATIENT
Start: 2020-03-16 | End: 2020-11-23 | Stop reason: ALTCHOICE

## 2020-03-16 NOTE — TELEPHONE ENCOUNTER
Message from Ryan Hernandez sent at 3/13/2020  3:55 PM EDT -----  Regarding: Dr Rozina Gottlieb  General Message/Vendor Calls     Caller's first and last name:        Reason for call: Pt is reporting that [facility] has cancelled his Rx for the Pen Needles and diabetic test strips due to no response from the practice regarding a Rx renewal        Callback required yes/no and why:yes         Best contact number(s):[phone]        Details to clarify the request:        Ryan Hernandez

## 2020-03-16 NOTE — TELEPHONE ENCOUNTER
Scripts were done and sent to Dr. Panchito Jerome to sign today. I also sent a message to Mr. Asiya Kitchen to let him know this.   Yuriy Vega

## 2020-03-26 ENCOUNTER — TELEPHONE (OUTPATIENT)
Dept: ENDOCRINOLOGY | Age: 60
End: 2020-03-26

## 2020-03-26 NOTE — TELEPHONE ENCOUNTER
----- Message from Ryan Hernandez sent at 3/26/2020  2:40 PM EDT -----  Regarding: Dr Rozina Gottlieb  General Message/Vendor Calls    Caller's first and last name:      Reason for call: Pt has a Telemedicine call scheduled for today 03/26/20 at 4:10.  Pt is questioning the validity of the visit when the doctor is not able to see him or have blood work done      [facility] required yes/no and why:      Best contact number(s):[phone]      Details to clarify the request:      Ryan Hernandez

## 2020-03-26 NOTE — TELEPHONE ENCOUNTER
I attempted to return this call and this was actually my 3 attempt today and I have reached his voice mail every time. I have left messages with each visit asking him to give me a call back so we can discuss the virtual visit. I left another one now.   Mathew Ernandez

## 2020-11-04 ENCOUNTER — TELEPHONE (OUTPATIENT)
Dept: ENDOCRINOLOGY | Age: 60
End: 2020-11-04

## 2020-11-04 NOTE — TELEPHONE ENCOUNTER
----- Message from Perla Estrada sent at 2020 10:40 AM EST -----  Regarding: Dr Joey Reeder  General Message/Vendor Calls    Caller's first and last name:      Reason for call:pt said his [facility] pharmacy has been trying to reach out to obtain an Prior Auth for his Victoza medication and so far they have not received an response  the Prior Auth is about to   the medication will not be covered by his insurance company with the the [address] required yes/no and why:yes for reason given above to confirm PA was sent      Best contact number(s):[phone]      Details to clarify the request:      Perla Estrada

## 2020-11-05 ENCOUNTER — TELEPHONE (OUTPATIENT)
Dept: ENDOCRINOLOGY | Age: 60
End: 2020-11-05

## 2020-11-05 NOTE — TELEPHONE ENCOUNTER
----- Message from Dennis Faith sent at 11/5/2020  1:04 PM EST -----  Regarding: /Refill  Medication Refill     Caller (if not patient):        Relationship of caller (if not patient):        Best contact number(s):[phone]        Name of medication and dosage if known:Lonny        Is patient out of this medication (yes/no):yes        Pharmacy name:[facility]     Pharmacy listed in chart? (yes/no):yes  Pharmacy phone number:        Details to clarify the request:Pt requesting  a prior-authorization for medication .        Dennis Faith

## 2020-11-05 NOTE — TELEPHONE ENCOUNTER
----- Message from Lauren Klein sent at 11/5/2020  1:04 PM EST -----  Regarding: /Refill  Medication Refill    Caller (if not patient):      Relationship of caller (if not patient):      Best contact number(s):[phone]      Name of medication and dosage if Kamaljit Armstrong      Is patient out of this medication (yes/no):yes      Pharmacy name:[facility]    Pharmacy listed in chart? (yes/no):yes  Pharmacy phone number:      Details to clarify the request:Pt requesting  a prior-authorization for medication .       Lauren Klein

## 2020-11-05 NOTE — TELEPHONE ENCOUNTER
I returned this call and let . Adri Rucker know that I am working on a PA for his Victoza. I also let him know that he hasn't been seen in over a year and he needed to make a appointment. I transferred him to the  to make that appointment.   Ana Ayala

## 2020-11-23 ENCOUNTER — VIRTUAL VISIT (OUTPATIENT)
Dept: ENDOCRINOLOGY | Age: 60
End: 2020-11-23
Payer: COMMERCIAL

## 2020-11-23 ENCOUNTER — TELEPHONE (OUTPATIENT)
Dept: ENDOCRINOLOGY | Age: 60
End: 2020-11-23

## 2020-11-23 DIAGNOSIS — E11.9 TYPE 2 DIABETES MELLITUS WITHOUT COMPLICATION, WITHOUT LONG-TERM CURRENT USE OF INSULIN (HCC): Primary | ICD-10-CM

## 2020-11-23 DIAGNOSIS — E03.9 HYPOTHYROIDISM, UNSPECIFIED TYPE: ICD-10-CM

## 2020-11-23 DIAGNOSIS — E78.5 HYPERLIPIDEMIA, UNSPECIFIED HYPERLIPIDEMIA TYPE: ICD-10-CM

## 2020-11-23 PROCEDURE — 99214 OFFICE O/P EST MOD 30 MIN: CPT | Performed by: INTERNAL MEDICINE

## 2020-11-23 RX ORDER — ATORVASTATIN CALCIUM 20 MG/1
20 TABLET, FILM COATED ORAL
COMMUNITY
Start: 2020-09-30

## 2020-11-23 RX ORDER — BLOOD SUGAR DIAGNOSTIC
STRIP MISCELLANEOUS
Qty: 100 PEN NEEDLE | Refills: 3 | Status: SHIPPED | OUTPATIENT
Start: 2020-11-23

## 2020-11-23 NOTE — PROGRESS NOTES
**DUE TO PANDEMIC AND HEALTH CONCERNS IN THE COMMUNITY, THIS PATIENT WAS EITHER ILL OR FOUND TO BE HIGH RISK FOR IN-PERSON EVALUATION WITHIN THE CLINIC. THE FOLLOWING IS A VIRTUAL TELEMEDICINE VIDEO ENCOUNTER VIA [employer], TO WHICH THE PATIENT AGREED. THE PURPOSE IS TO LIMIT INTERRUPTIONS IN HEALTHCARE AND TO PROVIDE FOR ONGOING URGENT NEEDS UNDER THE CURRENT CONDITIONS. CHIEF COMPLAINT: f/u evaluation for uncontrolled type 2 diabetes    HISTORY OF PRESENT ILLNESS:   Pat Marion is a 61 y.o. male with a PMHx as noted below who presents to the endocrinology clinic for f/u evaluation of uncontrolled type 2 diabetes. Last seen >1 year ago. No recent labs. Continued on same meds as before. Reports A1c of 7.1% as of last June.      Currently taking the following meds:  Jardiance 25mg each morning before breakfast  Metformin 1000mg twice daily  Glipizide 10mg before breakfast and dinner  Victoza 1.8mg daily before breakfast    Review of home blood glucose:  AM: reports ranging mostly <150 (116 and 92 provided as an example)      Review of most recent diabetes-related labs:  Lab Results   Component Value Date    WRE8SQJK A1c 7.8% 03/07/2019    LIU9IEYP 7.2% 02/28/2018    AGZ2VWAG 7.9% 11/14/2017    PMQ2MRCS 7.4 09/26/2019    PNH4LDMJ 7.2 09/28/2018    LDLCEXT , Total Chol 194 03/07/2019    CREATEXT Cr 1.22, GFR >60 03/07/2019    MALBEXT 2  ug/mg 03/07/2019    MALBEXT 0.7 03/07/2019     MCACR = Urine Microalbumin    PAST MEDICAL/SURGICAL HISTORY:   Past Medical History:   Diagnosis Date    Diabetes ([address]     Hypertension     Hypothyroid      Past Surgical History:   Procedure Laterality Date    HX HEENT      dental implant    HX ORTHOPAEDIC Right 2012    achilles repair       ALLERGIES:   No Known Allergies    MEDICATIONS ON ADMISSION:     Current Outpatient Medications:     atorvastatin (LIPITOR) 10 mg tablet, , Disp: , Rfl:     levothyroxine (SYNTHROID) 150 mcg tablet, TAKE 1 TABLET BY MOUTH  DAILY BEFORE BREAKFAST, Disp: 90 Tab, Rfl: 0    Jardiance 25 mg tablet, TAKE 1 TABLET BY MOUTH EVERY DAY BEFORE BREAKFAST, Disp: 90 Tab, Rfl: 3    VICTOZA 3-JAY JAY 0.6 mg/0.1 mL (18 mg/3 mL) pnij, INJECT 1.8 MILLIGRAM SUBCUTANEOUSLY DAILY, Disp: 9 Pen, Rfl: 3    glipiZIDE (GLUCOTROL) 10 mg tablet, TAKE 1 TABLET  BY MOUTH  BEFORE BREAKFAST AND DINNER, Disp: 180 Tab, Rfl: 4    meloxicam (MOBIC) 15 mg tablet, , Disp: , Rfl:     aspirin 81 mg chewable tablet, Take 81 mg by mouth daily. , Disp: , Rfl:     metFORMIN (GLUCOPHAGE) 1,000 mg tablet, Take 1,000 mg by mouth two (2) times daily (with meals). , Disp: , Rfl:     lisinopril-hydroCHLOROthiazide (PRINZIDE, ZESTORETIC) 20-12.5 mg per tablet, Take 1 Tab by mouth daily. , Disp: , Rfl:     Insulin Needles, Disposable, (Elizabeth Pen Needle) 32 gauge x 5/32\" ndle, Use with Victoza daily, Disp: 100 Pen Needle, Rfl: 3    glucose blood VI test strips (OneTouch Ultra Blue Test Strip) strip, CHECK GLUCOSE 2 TIMES DAILY, Disp: 200 Strip, Rfl: 3    loratadine (CLARITIN) 10 mg tablet, Take 10 mg by mouth., Disp: , Rfl:     MULTIVIT-MINERALS/FA/LYCOPENE (ONE-A-DAY MEN'S PO), Take 1 Tab by mouth daily. , Disp: , Rfl:     albuterol (PROAIR HFA) 90 mcg/actuation inhaler, Take 2 Puffs by inhalation every four (4) hours as needed for Wheezing., Disp: , Rfl:     SOCIAL HISTORY:   Social History     Socioeconomic History    Marital status:      Spouse name: Not on file    Number of children: Not on file    Years of education: Not on file    Highest education level: Not on file   Occupational History    Not on file   Social Needs    Financial resource strain: Not on file    Food insecurity     Worry: Not on file     Inability: Not on file    Transportation needs     Medical: Not on file     Non-medical: Not on file   Tobacco Use    Smoking status: Never Smoker    Smokeless tobacco: Never Used   Substance and Sexual Activity    Alcohol use:  Yes     Alcohol/week: 2.0 standard drinks     Types: 1 Glasses of wine, 1 Cans of beer per week    Drug use: No    Sexual activity: Not on file   Lifestyle    Physical activity     Days per week: Not on file     Minutes per session: Not on file    Stress: Not on file   Relationships    Social connections     Talks on phone: Not on file     Gets together: Not on file     Attends Synagogue service: Not on file     Active member of club or organization: Not on file     Attends meetings of clubs or organizations: Not on file     Relationship status: Not on file    Intimate partner violence     Fear of current or ex partner: Not on file     Emotionally abused: Not on file     Physically abused: Not on file     Forced sexual activity: Not on file   Other Topics Concern    Not on file   Social History Narrative    Not on file       FAMILY HISTORY:  Family History   Problem Relation Age of Onset    Breast Cancer Mother     Diabetes Father        REVIEW OF SYSTEMS: Complete ROS assessed and noted for that which is described above, all else are negative. Eyes: normal  ENT: normal  CVS: normal  Resp: normal  GI: normal  : normal  GYN: normal  Endocrine: normal  Integument: normal  Musculoskeletal: normal  Neuro: normal  Psych: normal    PHYSICAL EXAMINATION:  Telemedicine Visit    GENERAL: NCAT, Appears well nourished  EYES: EOMI, non-icteric, no proptosis  Ear/Nose/Throat: NCAT, no visible inflammation or masses  CARDIOVASCULAR: no cyanosis, no visible JVD  RESPIRATORY: comfortable respirations observed, no cyanosis  MUSCULOSKELETAL: Normal ROM of upper extremities observed  SKIN: No edema, rash, or other significant changes observed  NEUROLOGIC:  AAOx3  PSYCHIATRIC: Normal affect, Normal insight and judgement    REVIEW OF LABORATORY AND RADIOLOGY DATA:   Labs and documentation have been reviewed as described above.      ASSESSMENT AND PLAN:   Barbara Wiggins is a 61 y.o. male with a PMHx as noted above who presents to the endocrinology clinic for f/u evaluation of uncontrolled type 2 diabetes. DM2  HTN  HLD  hypothyroidism    DM2:  Jardiance 25mg each morning before breakfast  Metformin 1000mg twice daily  Glipizide 10mg before breakfast and dinner  Victoza 1.8mg daily before breakfast  New pen needles ordered per his request, longer micro needles  CHECK Sugars once daily    HTN: telemedicine visit today  HLD: started a statin since last visit, atorvastatin,   Hypothyroidism: on 150 mcg once daily, reports normal labs recently, will review    Labs: will reach out for recent outside labs    RTC: 11/23/20     20 minutes spent toward telemedicine visit today of which >50% of this time was spent in counseling and coordination of care. Geena Gibbs.  [address] [facility]

## 2020-11-23 NOTE — TELEPHONE ENCOUNTER
I called Dr. Tony Estrella office and they will send over the most recent labs which were drawn in June 2020.   Fartun Mcneill

## 2020-11-23 NOTE — TELEPHONE ENCOUNTER
[facility] please reach out to Dr. Peg Walker office for recent diabetes panel, thanks ! Natan Archer.  [address] [facility]  [address] [place] [address]

## 2020-11-24 RX ORDER — LEVOTHYROXINE SODIUM 150 UG/1
TABLET ORAL
Qty: 90 TAB | Refills: 3 | Status: SHIPPED | OUTPATIENT
Start: 2020-11-24 | End: 2022-05-20 | Stop reason: DRUGHIGH

## 2020-11-24 RX ORDER — GLIPIZIDE 10 MG/1
TABLET ORAL
Qty: 180 TAB | Refills: 3 | Status: SHIPPED | OUTPATIENT
Start: 2020-11-24

## 2020-12-09 ENCOUNTER — DOCUMENTATION ONLY (OUTPATIENT)
Dept: ENDOCRINOLOGY | Age: 60
End: 2020-12-09

## 2020-12-21 RX ORDER — LIRAGLUTIDE 6 MG/ML
INJECTION SUBCUTANEOUS
Qty: 9 ADJUSTABLE DOSE PRE-FILLED PEN SYRINGE | Refills: 3 | Status: SHIPPED | OUTPATIENT
Start: 2020-12-21 | End: 2020-12-24 | Stop reason: SDUPTHER

## 2020-12-24 ENCOUNTER — PATIENT MESSAGE (OUTPATIENT)
Dept: ENDOCRINOLOGY | Age: 60
End: 2020-12-24

## 2020-12-24 RX ORDER — LIRAGLUTIDE 6 MG/ML
INJECTION SUBCUTANEOUS
Qty: 3 ADJUSTABLE DOSE PRE-FILLED PEN SYRINGE | Refills: 12 | Status: SHIPPED | OUTPATIENT
Start: 2020-12-24 | End: 2022-05-20

## 2020-12-29 NOTE — TELEPHONE ENCOUNTER
To add to the confusion. [facility] faxed a request for Victoza for a 3 mos supply. Could you confirm if [facility] will be filling this or [facility]? Sandra's message said that insurance will only cover 30 days at a time. Very confusing. Please let me know if I should have  send this prescription to [facility]. Thanks.

## 2021-03-01 ENCOUNTER — TELEPHONE (OUTPATIENT)
Dept: ENDOCRINOLOGY | Age: 61
End: 2021-03-01

## 2021-03-01 ENCOUNTER — TRANSCRIBE ORDER (OUTPATIENT)
Dept: ENDOCRINOLOGY | Age: 61
End: 2021-03-01

## 2021-03-01 NOTE — TELEPHONE ENCOUNTER
----- Message from Erum Query sent at 3/1/2021 12:43 PM EST -----  Regarding: MD Herrera/ telephone  Patient's first and last name:    Shen Jovel  : 1960    Caller's first and last name:  pt    Reason for call:   Pre Authorization    Callback required yes/no and why: yes     Best contact number(s):  [phone]    Details to clarify the request:  Pt's pharmacy [facility] is requesting PA for medication Jardiance 25 mg. Pt currently has two days supply of medication. Pt is requesting call for status of PA.

## 2021-03-04 RX ORDER — EMPAGLIFLOZIN 25 MG/1
TABLET, FILM COATED ORAL
Qty: 90 TAB | Refills: 3 | Status: SHIPPED | OUTPATIENT
Start: 2021-03-04

## 2021-03-04 NOTE — TELEPHONE ENCOUNTER
I received approval for the Jardiance good through 3/4/2022. [id number]. Patient and pharmacy have been notified.   Leora Schuler

## 2021-04-05 ENCOUNTER — TELEPHONE (OUTPATIENT)
Dept: ENDOCRINOLOGY | Age: 61
End: 2021-04-05

## 2021-04-05 NOTE — TELEPHONE ENCOUNTER
----- Message from Mike Rubio sent at 4/5/2021  3:46 PM EDT -----  Regarding: Dr. Monroe Sin: [phone]  General Message/Vendor Calls    Caller's first and last name:Pt      Reason for call:Pt would like a call back to confirm that his paperwork for his blood work can be sent to his address on file. Callback required yes/no and why:Yes, discuss.        Best contact number(s):[phone]      Details to clarify the request:n/a      Mike Rubio

## 2021-04-06 NOTE — TELEPHONE ENCOUNTER
I returned this call this morning to let Mr. Alicea Hector know that I will be putting the lab order in the mail today. He said that he didn't need it because he cancelled his appointment last night. He is going to follow up with his PCP because Dr. Rhonda Aguilera is leaving the practice.   Rossana Araujo

## 2021-09-08 ENCOUNTER — OFFICE VISIT (OUTPATIENT)
Dept: SURGERY | Age: 61
End: 2021-09-08
Payer: COMMERCIAL

## 2021-09-08 VITALS
SYSTOLIC BLOOD PRESSURE: 143 MMHG | OXYGEN SATURATION: 99 % | RESPIRATION RATE: 20 BRPM | DIASTOLIC BLOOD PRESSURE: 72 MMHG | TEMPERATURE: 97.2 F | WEIGHT: 231.6 LBS | BODY MASS INDEX: 31.37 KG/M2 | HEIGHT: 72 IN | HEART RATE: 60 BPM

## 2021-09-08 DIAGNOSIS — R19.03 ABDOMINAL WALL MASS OF RIGHT LOWER QUADRANT: Primary | ICD-10-CM

## 2021-09-08 PROCEDURE — 99203 OFFICE O/P NEW LOW 30 MIN: CPT | Performed by: SURGERY

## 2021-09-08 NOTE — LETTER
9/8/2021    Patient: Killian Kauffman   YOB: 1960   Date of Visit: 9/8/2021     Jarad Jha MD  [address] [facility]. [address] [id number]  Via Fax: [phone]    Dear Jarad Jha MD,      Thank you for referring Mr. Killian Kauffman to [address] for evaluation. My notes for this consultation are attached. If you have questions, please do not hesitate to call me. I look forward to following your patient along with you.       Sincerely,    Vinod Alvarez MD

## 2021-09-08 NOTE — PROGRESS NOTES
HISTORY OF PRESENT ILLNESS  Isac Ji is a 64 y.o. male who comes in for consultation by Melissa Reinoso MD for an abdominal wall mass  Mass  Pertinent negatives include no chest pain, no abdominal pain, no headaches and no shortness of breath. Follow-up  Pertinent negatives include no chest pain, no abdominal pain, no headaches and no shortness of breath. He noted a lump in the right lower abdomen in March 2018. US suggested a lipoma at the time. He was going to have it removed but tore his quadraceps muscle. He now comes in to discuss removal.  It is not very painful but is bothersome and he has not had surgery in the area previously. It has gotten a little larger. He denies associated nausea, vomiting, diarrhea, constipation, melena, hematochezia, dysuria or hematuria. Past Medical History:   Diagnosis Date    Diabetes ([address]     Hypertension     Hypothyroid      Past Surgical History:   Procedure Laterality Date    HX HEENT      dental implant    HX ORTHOPAEDIC Right 2012    achilles repair    HX ORTHOPAEDIC Right 2018    rt quadracep tendon repair    HX ORTHOPAEDIC Left 2020    Lt rotator cuff repair     Family History   Problem Relation Age of Onset    Breast Cancer Mother     Diabetes Father      Social History     Tobacco Use    Smoking status: Never Smoker    Smokeless tobacco: Never Used   Vaping Use    Vaping Use: Never used   Substance Use Topics    Alcohol use:  Yes     Alcohol/week: 2.0 standard drinks     Types: 1 Glasses of wine, 1 Cans of beer per week    Drug use: No     Current Outpatient Medications   Medication Sig    Jardiance 25 mg tablet TAKE 1 TABLET BY MOUTH EVERY DAY BEFORE BREAKFAST    liraglutide (Victoza 3-Mario Alberto) 0.6 mg/0.1 mL (18 mg/3 mL) pnij INJECT 1.8 MILLIGRAM SUBCUTANEOUSLY DAILY    glipiZIDE (GLUCOTROL) 10 mg tablet TAKE 1 TABLET  BY MOUTH  BEFORE BREAKFAST AND DINNER    levothyroxine (SYNTHROID) 150 mcg tablet TAKE 1 TABLET BY MOUTH  DAILY BEFORE BREAKFAST    atorvastatin (LIPITOR) 10 mg tablet     Insulin Needles, Disposable, (BD Ultra-Fine Micro Pen Needle) 32 gauge x 1/4\" ndle Use once with Victoza Pen once daily    glucose blood VI test strips (OneTouch Ultra Blue Test Strip) strip CHECK GLUCOSE 2 TIMES DAILY    meloxicam (MOBIC) 15 mg tablet     metFORMIN (GLUCOPHAGE) 1,000 mg tablet Take 1,000 mg by mouth two (2) times daily (with meals).  lisinopril-hydroCHLOROthiazide (PRINZIDE, ZESTORETIC) 20-12.5 mg per tablet Take 1 Tab by mouth daily.  albuterol (PROAIR HFA) 90 mcg/actuation inhaler Take 2 Puffs by inhalation every four (4) hours as needed for Wheezing.  loratadine (CLARITIN) 10 mg tablet Take 10 mg by mouth. (Patient not taking: Reported on 9/8/2021)    MULTIVIT-MINERALS/FA/LYCOPENE (ONE-A-DAY MEN'S PO) Take 1 Tab by mouth daily. (Patient not taking: Reported on 9/8/2021)    aspirin 81 mg chewable tablet Take 81 mg by mouth daily. (Patient not taking: Reported on 9/8/2021)     No current facility-administered medications for this visit. No Known Allergies    Review of Systems   Constitutional: Negative for chills, diaphoresis, fever, malaise/fatigue and weight loss. HENT: Negative for congestion, ear pain and sore throat. Eyes: Negative for blurred vision and pain. Respiratory: Negative for cough, hemoptysis, sputum production, shortness of breath, wheezing and stridor. Cardiovascular: Negative for chest pain, palpitations, orthopnea, claudication, leg swelling and PND. Gastrointestinal: Negative for abdominal pain, blood in stool, constipation, diarrhea, heartburn, melena, nausea and vomiting. Genitourinary: Negative for dysuria, flank pain, frequency, hematuria and urgency. Musculoskeletal: Negative for back pain, joint pain, myalgias and neck pain. Skin: Negative for itching and rash. Neurological: Negative for dizziness, tremors, focal weakness, seizures, weakness and headaches. Endo/Heme/Allergies: Negative for polydipsia. Psychiatric/Behavioral: Negative for depression and memory loss. The patient is not nervous/anxious. Visit Vitals  BP (!) 143/72 (BP 1 Location: Left upper arm, BP Patient Position: Sitting)   Pulse 60   Temp 97.2 °F (36.2 °C) (Temporal)   Resp 20   Ht 6' (1.829 m)   Wt 105.1 kg (231 lb 9.6 oz)   SpO2 99%   BMI 31.41 kg/m²       Physical Exam  Constitutional:       General: He is not in acute distress. Appearance: Normal appearance. He is well-developed. He is not diaphoretic. HENT:      Head: Normocephalic and atraumatic. Mouth/Throat:      Pharynx: No oropharyngeal exudate. Eyes:      General: No scleral icterus. Conjunctiva/sclera: Conjunctivae normal.      Pupils: Pupils are equal, round, and reactive to light. Neck:      Thyroid: No thyromegaly. Trachea: No tracheal deviation. Cardiovascular:      Rate and Rhythm: Normal rate and regular rhythm. Heart sounds: Normal heart sounds. No murmur heard. No friction rub. No gallop. Pulmonary:      Effort: Pulmonary effort is normal. No respiratory distress. Breath sounds: Normal breath sounds. No stridor. No wheezing or rales. Abdominal:      General: Bowel sounds are normal. There is no distension. Palpations: Abdomen is soft. There is mass (4-5 cm soft deep subcutaneous mass in lower abdomen to right of midline). Tenderness: There is no abdominal tenderness. There is no guarding or rebound. Hernia: No hernia is present. There is no hernia in the ventral area or left inguinal area. Genitourinary:     Penis: Circumcised. Testes: Normal. Cremasteric reflex is present. Musculoskeletal:         General: No tenderness. Normal range of motion. Cervical back: Normal range of motion and neck supple. Lymphadenopathy:      Cervical: No cervical adenopathy. Lower Body: No right inguinal adenopathy. No left inguinal adenopathy.    Skin: General: Skin is warm and dry. Findings: No erythema or rash. Neurological:      Mental Status: He is alert and oriented to person, place, and time. Cranial Nerves: No cranial nerve deficit. Coordination: Coordination normal.   Psychiatric:         Behavior: Behavior normal.         Thought Content: Thought content normal.         Judgment: Judgment normal.         ASSESSMENT and PLAN  1. Right lower abdominal mass feels like a lipoma or possibly an area of fat necrosis, less likely a hernia or malignancy. I explained to him about the anatomy and pathophysiology of these processes and options for close follow up, US, and excision. Risks of excision include, but are not limited to, bleeding, infection, poor healing/cosmesis, seroma/hematoma, DVT/PE  2. NIDDM type 2. Improved on current therapy  3. Obesity  BMI 34. Recommended exercise and weight loss      At this point he desires excision of a 5 cm abdominal wall mass under MAC as an outpatient     The patient was counseled at length about the risks of [place] Covid-19 in the noemy-operative and post-operative states including the recovery window of their procedure. The patient was made aware that [place]and[place] Covid-19 after a surgical procedure may worsen their prognosis for recovering from the virus and lend to a higher morbidity and or mortality risk. The patient was given the options of postponing their procedure. All of the risks, benefits, and alternatives were discussed. The patient  wishes to proceed with the procedure.       Regla Camargo MD FACS

## 2021-09-08 NOTE — PROGRESS NOTES
Identified pt with two pt identifiers(name and ). Reviewed record in preparation for visit and have obtained necessary documentation. All patient medications has been reviewed. Chief Complaint   Patient presents with    Follow-up     re-eval abdominal lipoma       Health Maintenance Due   Topic    Hepatitis C Screening     COVID-19 Vaccine (1)    DTaP/Tdap/Td series (1 - Tdap)    Colorectal Cancer Screening Combo     Shingrix Vaccine Age 50> (1 of 2)    MICROALBUMIN Q1     Foot Exam Q1     Eye Exam Retinal or Dilated     A1C test (Diabetic or Prediabetic)     Lipid Screen     Flu Vaccine (1)       Vitals:    21 1514   BP: (!) 143/72   Pulse: 60   Resp: 20   Temp: 97.2 °F (36.2 °C)   TempSrc: Temporal   SpO2: 99%   Weight: 105.1 kg (231 lb 9.6 oz)   Height: 6' (1.829 m)   PainSc:   0 - No pain       4. Have you been to the ER, urgent care clinic since your last visit? Hospitalized since your last visit? No    5. Have you seen or consulted any other health care providers outside of the [address] [place] [address] since your last visit? Include any pap smears or colon screening. No      Patient is accompanied by self I have received verbal consent from Stephany Foster to discuss any/all medical information while they are present in the room.

## 2021-10-01 RX ORDER — LISINOPRIL 10 MG/1
1 TABLET ORAL DAILY
COMMUNITY
Start: 2021-07-09

## 2021-10-01 NOTE — PERIOP NOTES
[facility]  Ambulatory Surgery Unit  Pre-operative Instructions    Surgery/Procedure Date  10/11            Tentative Arrival Time TBD      1. On the day of your surgery/procedure, please report to the Ambulatory Surgery Unit Registration Desk and sign in at your designated time. The Ambulatory Surgery Unit is located in [facility] on the [facility] side [facility] across from the [facility] building. Please have all of your health insurance cards and a photo ID. 2. You must have someone with you to drive you home, as you should not drive a car for 24 hours following anesthesia. Please make arrangements for a responsible adult friend or family member to stay with you for at least the first 24 hours after your surgery. 3. Do not have anything to eat or drink (including water, gum, mints, coffee, juice) after 11:59 PM  10/10. This may not apply to medications prescribed by your physician. (Please note below the special instructions with medications to take the morning of surgery, if applicable.)    4. We recommend you do not drink any alcoholic beverages for 24 hours before and after your surgery. 5. Contact your surgeons office for instructions on the following medications: non-steroidal anti-inflammatory drugs (i.e. Advil, Aleve), vitamins, and supplements. (Some surgeons will want you to stop these medications prior to surgery and others may allow you to take them)   **If you are currently taking Plavix, Coumadin, Aspirin and/or other blood-thinning agents, contact your surgeon for instructions. ** Your surgeon will partner with the physician prescribing these medications to determine if it is safe to stop or if you need to continue taking. Please do not stop taking these medications without instructions from your surgeon.     6. In an effort to help prevent surgical site infection, we ask that you shower with an anti-bacterial soap (i.e. Dial/Safeguard, or the soap provided to you at your preadmission testing appointment) for 3 days prior to and on the morning of surgery, using a fresh towel after each shower. (Please begin this process with fresh bed linens.) Do not apply any lotions, powders, or deodorants after the shower on the day of your procedure. If applicable, please do not shave the operative site for 48 hours prior to surgery. 7. Wear comfortable clothes. Wear glasses instead of contacts. Do not bring any jewelry or money (other than copays or fees as instructed). Do not wear make-up, particularly mascara, the morning of your surgery. Do not wear nail polish, particularly if you are having foot /hand surgery. Wear your hair loose or down, no ponytails, buns, simone pins or clips. All body piercings must be removed. 8. You should understand that if you do not follow these instructions your surgery may be cancelled. If your physical condition changes (i.e. fever, cold or flu) please contact your surgeon as soon as possible. 9. It is important that you be on time. If a situation occurs where you may be late, or if you have any questions or problems, please call [phone].    10. Your surgery time may be subject to change. You will receive a phone call the day prior to surgery to confirm your arrival time. Special Instructions: Take all medications and inhalers, as prescribed, on the morning of surgery with a sip of water EXCEPT: diabetic medications      Insulin Dependent Diabetic patients: Take your diabetic medications as prescribed the day before surgery. Hold all diabetic medications the day of surgery. If you are scheduled to arrive for surgery after 8:00 AM, and your AM blood sugar is >200, please call Ambulatory Surgery. I understand a pre-operative phone call will be made to verify my surgery time. In the event that I am not available, I give permission for a message to be left on my answering service and/or with another person? yes    Reviewed by phone with pt, verbalized understanding.   covid testing Thur 11/7 between 5500-1145am     ___________________      ___________________      ________________  (Signature of Patient)          (Witness)                   (Date and Time)

## 2021-10-07 ENCOUNTER — HOSPITAL ENCOUNTER (OUTPATIENT)
Dept: PREADMISSION TESTING | Age: 61
Discharge: HOME | End: 2021-10-07
Payer: COMMERCIAL

## 2021-10-07 PROCEDURE — U0005 INFEC AGEN DETEC AMPLI PROBE: HCPCS

## 2021-10-08 ENCOUNTER — ANESTHESIA EVENT (OUTPATIENT)
Dept: SURGERY | Age: 61
End: 2021-10-08
Payer: COMMERCIAL

## 2021-10-08 LAB
SARS-COV-2, XPLCVT: NOT DETECTED
SOURCE, COVRS: NORMAL

## 2021-10-11 ENCOUNTER — ANESTHESIA (OUTPATIENT)
Dept: SURGERY | Age: 61
End: 2021-10-11
Payer: COMMERCIAL

## 2021-10-11 ENCOUNTER — HOSPITAL ENCOUNTER (OUTPATIENT)
Age: 61
Setting detail: OUTPATIENT SURGERY
Discharge: HOME | End: 2021-10-11
Attending: SURGERY | Admitting: SURGERY
Payer: COMMERCIAL

## 2021-10-11 VITALS
HEART RATE: 53 BPM | BODY MASS INDEX: 30.34 KG/M2 | WEIGHT: 224 LBS | SYSTOLIC BLOOD PRESSURE: 121 MMHG | HEIGHT: 72 IN | RESPIRATION RATE: 18 BRPM | TEMPERATURE: 98.1 F | DIASTOLIC BLOOD PRESSURE: 65 MMHG | OXYGEN SATURATION: 97 %

## 2021-10-11 DIAGNOSIS — R19.03 ABDOMINAL WALL MASS OF RIGHT LOWER QUADRANT: Primary | ICD-10-CM

## 2021-10-11 LAB
GLUCOSE BLD STRIP.AUTO-MCNC: 113 MG/DL (ref 65–117)
GLUCOSE BLD STRIP.AUTO-MCNC: 119 MG/DL (ref 65–117)
SERVICE CMNT-IMP: ABNORMAL
SERVICE CMNT-IMP: NORMAL

## 2021-10-11 PROCEDURE — 88305 TISSUE EXAM BY PATHOLOGIST: CPT

## 2021-10-11 PROCEDURE — 74011000250 HC RX REV CODE- 250: Performed by: NURSE ANESTHETIST, CERTIFIED REGISTERED

## 2021-10-11 PROCEDURE — 76030000000 HC AMB SURG OR TIME 0.5 TO 1: Performed by: SURGERY

## 2021-10-11 PROCEDURE — 74011000250 HC RX REV CODE- 250

## 2021-10-11 PROCEDURE — 76060000061 HC AMB SURG ANES 0.5 TO 1 HR: Performed by: SURGERY

## 2021-10-11 PROCEDURE — 22903 EXC ABD LES SC 3 CM/>: CPT | Performed by: SURGERY

## 2021-10-11 PROCEDURE — 77030010507 HC ADH SKN DERMBND J&J -B: Performed by: SURGERY

## 2021-10-11 PROCEDURE — 77030031139 HC SUT VCRL2 J&J -A: Performed by: SURGERY

## 2021-10-11 PROCEDURE — 76210000046 HC AMBSU PH II REC FIRST 0.5 HR: Performed by: SURGERY

## 2021-10-11 PROCEDURE — 77030021352 HC CBL LD SYS DISP COVD -B: Performed by: SURGERY

## 2021-10-11 PROCEDURE — 74011250636 HC RX REV CODE- 250/636

## 2021-10-11 PROCEDURE — 74011250636 HC RX REV CODE- 250/636: Performed by: NURSE ANESTHETIST, CERTIFIED REGISTERED

## 2021-10-11 PROCEDURE — 76210000040 HC AMBSU PH I REC FIRST 0.5 HR: Performed by: SURGERY

## 2021-10-11 PROCEDURE — 74011000250 HC RX REV CODE- 250: Performed by: SURGERY

## 2021-10-11 PROCEDURE — 88313 SPECIAL STAINS GROUP 2: CPT

## 2021-10-11 PROCEDURE — 2709999900 HC NON-CHARGEABLE SUPPLY: Performed by: SURGERY

## 2021-10-11 PROCEDURE — 74011250636 HC RX REV CODE- 250/636: Performed by: ANESTHESIOLOGY

## 2021-10-11 PROCEDURE — 82962 GLUCOSE BLOOD TEST: CPT

## 2021-10-11 PROCEDURE — 74011250636 HC RX REV CODE- 250/636: Performed by: SURGERY

## 2021-10-11 RX ORDER — ONDANSETRON 2 MG/ML
4 INJECTION INTRAMUSCULAR; INTRAVENOUS AS NEEDED
Status: DISCONTINUED | OUTPATIENT
Start: 2021-10-11 | End: 2021-10-11 | Stop reason: HOSPADM

## 2021-10-11 RX ORDER — MIDAZOLAM HYDROCHLORIDE 1 MG/ML
INJECTION, SOLUTION INTRAMUSCULAR; INTRAVENOUS AS NEEDED
Status: DISCONTINUED | OUTPATIENT
Start: 2021-10-11 | End: 2021-10-11 | Stop reason: HOSPADM

## 2021-10-11 RX ORDER — BUPIVACAINE HYDROCHLORIDE 5 MG/ML
INJECTION, SOLUTION EPIDURAL; INTRACAUDAL AS NEEDED
Status: DISCONTINUED | OUTPATIENT
Start: 2021-10-11 | End: 2021-10-11 | Stop reason: HOSPADM

## 2021-10-11 RX ORDER — LIDOCAINE HYDROCHLORIDE 10 MG/ML
0.1 INJECTION, SOLUTION EPIDURAL; INFILTRATION; INTRACAUDAL; PERINEURAL AS NEEDED
Status: DISCONTINUED | OUTPATIENT
Start: 2021-10-11 | End: 2021-10-11 | Stop reason: HOSPADM

## 2021-10-11 RX ORDER — SODIUM CHLORIDE 0.9 % (FLUSH) 0.9 %
5-40 SYRINGE (ML) INJECTION AS NEEDED
Status: DISCONTINUED | OUTPATIENT
Start: 2021-10-11 | End: 2021-10-11 | Stop reason: HOSPADM

## 2021-10-11 RX ORDER — CEFAZOLIN SODIUM 1 G/3ML
INJECTION, POWDER, FOR SOLUTION INTRAMUSCULAR; INTRAVENOUS
Status: DISCONTINUED
Start: 2021-10-11 | End: 2021-10-11 | Stop reason: HOSPADM

## 2021-10-11 RX ORDER — SODIUM CHLORIDE, SODIUM LACTATE, POTASSIUM CHLORIDE, CALCIUM CHLORIDE 600; 310; 30; 20 MG/100ML; MG/100ML; MG/100ML; MG/100ML
25 INJECTION, SOLUTION INTRAVENOUS CONTINUOUS
Status: DISCONTINUED | OUTPATIENT
Start: 2021-10-11 | End: 2021-10-11 | Stop reason: HOSPADM

## 2021-10-11 RX ORDER — IBUPROFEN 800 MG/1
800 TABLET ORAL
Qty: 30 TABLET | Refills: 0 | Status: SHIPPED | OUTPATIENT
Start: 2021-10-11 | End: 2021-10-18

## 2021-10-11 RX ORDER — DIPHENHYDRAMINE HYDROCHLORIDE 50 MG/ML
12.5 INJECTION, SOLUTION INTRAMUSCULAR; INTRAVENOUS AS NEEDED
Status: DISCONTINUED | OUTPATIENT
Start: 2021-10-11 | End: 2021-10-11 | Stop reason: HOSPADM

## 2021-10-11 RX ORDER — CEFAZOLIN SODIUM 1 G/3ML
INJECTION, POWDER, FOR SOLUTION INTRAMUSCULAR; INTRAVENOUS
Status: DISCONTINUED
Start: 2021-10-11 | End: 2021-10-11 | Stop reason: WASHOUT

## 2021-10-11 RX ORDER — OXYCODONE AND ACETAMINOPHEN 5; 325 MG/1; MG/1
1 TABLET ORAL
Status: DISCONTINUED | OUTPATIENT
Start: 2021-10-11 | End: 2021-10-11 | Stop reason: HOSPADM

## 2021-10-11 RX ORDER — HYDROMORPHONE HYDROCHLORIDE 1 MG/ML
.2-.5 INJECTION, SOLUTION INTRAMUSCULAR; INTRAVENOUS; SUBCUTANEOUS ONCE
Status: DISCONTINUED | OUTPATIENT
Start: 2021-10-11 | End: 2021-10-11 | Stop reason: HOSPADM

## 2021-10-11 RX ORDER — LIDOCAINE HYDROCHLORIDE AND EPINEPHRINE 10; 10 MG/ML; UG/ML
INJECTION, SOLUTION INFILTRATION; PERINEURAL AS NEEDED
Status: DISCONTINUED | OUTPATIENT
Start: 2021-10-11 | End: 2021-10-11 | Stop reason: HOSPADM

## 2021-10-11 RX ORDER — FENTANYL CITRATE 50 UG/ML
25 INJECTION, SOLUTION INTRAMUSCULAR; INTRAVENOUS
Status: DISCONTINUED | OUTPATIENT
Start: 2021-10-11 | End: 2021-10-11 | Stop reason: HOSPADM

## 2021-10-11 RX ORDER — WATER FOR INJECTION,STERILE
VIAL (ML) INJECTION
Status: DISCONTINUED
Start: 2021-10-11 | End: 2021-10-11 | Stop reason: HOSPADM

## 2021-10-11 RX ORDER — OXYCODONE AND ACETAMINOPHEN 5; 325 MG/1; MG/1
1 TABLET ORAL
Qty: 10 TABLET | Refills: 0 | Status: SHIPPED | OUTPATIENT
Start: 2021-10-11 | End: 2021-10-14

## 2021-10-11 RX ORDER — SODIUM CHLORIDE 0.9 % (FLUSH) 0.9 %
5-40 SYRINGE (ML) INJECTION EVERY 8 HOURS
Status: DISCONTINUED | OUTPATIENT
Start: 2021-10-11 | End: 2021-10-11 | Stop reason: HOSPADM

## 2021-10-11 RX ORDER — DEXAMETHASONE SODIUM PHOSPHATE 4 MG/ML
INJECTION, SOLUTION INTRA-ARTICULAR; INTRALESIONAL; INTRAMUSCULAR; INTRAVENOUS; SOFT TISSUE AS NEEDED
Status: DISCONTINUED | OUTPATIENT
Start: 2021-10-11 | End: 2021-10-11 | Stop reason: HOSPADM

## 2021-10-11 RX ORDER — KETAMINE HYDROCHLORIDE 10 MG/ML
INJECTION, SOLUTION INTRAMUSCULAR; INTRAVENOUS AS NEEDED
Status: DISCONTINUED | OUTPATIENT
Start: 2021-10-11 | End: 2021-10-11 | Stop reason: HOSPADM

## 2021-10-11 RX ORDER — MORPHINE SULFATE 10 MG/ML
2 INJECTION, SOLUTION INTRAMUSCULAR; INTRAVENOUS
Status: DISCONTINUED | OUTPATIENT
Start: 2021-10-11 | End: 2021-10-11 | Stop reason: HOSPADM

## 2021-10-11 RX ORDER — PROPOFOL 10 MG/ML
INJECTION, EMULSION INTRAVENOUS
Status: DISCONTINUED | OUTPATIENT
Start: 2021-10-11 | End: 2021-10-11 | Stop reason: HOSPADM

## 2021-10-11 RX ADMIN — DEXAMETHASONE SODIUM PHOSPHATE 4 MG: 4 INJECTION, SOLUTION INTRAMUSCULAR; INTRAVENOUS at 13:34

## 2021-10-11 RX ADMIN — SODIUM CHLORIDE 2 MCG: 900 INJECTION, SOLUTION INTRAVENOUS at 13:45

## 2021-10-11 RX ADMIN — KETAMINE HYDROCHLORIDE 10 MG: 10 INJECTION, SOLUTION INTRAMUSCULAR; INTRAVENOUS at 13:36

## 2021-10-11 RX ADMIN — PROPOFOL 50 MCG/KG/MIN: 10 INJECTION, EMULSION INTRAVENOUS at 13:18

## 2021-10-11 RX ADMIN — KETAMINE HYDROCHLORIDE 20 MG: 10 INJECTION, SOLUTION INTRAMUSCULAR; INTRAVENOUS at 13:26

## 2021-10-11 RX ADMIN — SODIUM CHLORIDE 4 MCG: 900 INJECTION, SOLUTION INTRAVENOUS at 13:35

## 2021-10-11 RX ADMIN — CEFAZOLIN 2 G: 1 INJECTION, POWDER, FOR SOLUTION INTRAMUSCULAR; INTRAVENOUS; PARENTERAL at 13:23

## 2021-10-11 RX ADMIN — SODIUM CHLORIDE 4 MCG: 900 INJECTION, SOLUTION INTRAVENOUS at 13:40

## 2021-10-11 RX ADMIN — SODIUM CHLORIDE, POTASSIUM CHLORIDE, SODIUM LACTATE AND CALCIUM CHLORIDE 25 ML/HR: 600; 310; 30; 20 INJECTION, SOLUTION INTRAVENOUS at 12:21

## 2021-10-11 RX ADMIN — MIDAZOLAM HYDROCHLORIDE 2 MG: 1 INJECTION, SOLUTION INTRAMUSCULAR; INTRAVENOUS at 13:14

## 2021-10-11 NOTE — ANESTHESIA POSTPROCEDURE EVALUATION
Procedure(s):  EXCISION 5CM RIGHT ABDOMINAL WALL MASS. MAC    Anesthesia Post Evaluation      Multimodal analgesia: multimodal analgesia used between 6 hours prior to anesthesia start to PACU discharge  Patient location during evaluation: PACU  Patient participation: complete - patient participated  Level of consciousness: awake and alert  Pain score: 0  Airway patency: patent  Anesthetic complications: no  Cardiovascular status: acceptable  Respiratory status: acceptable  Hydration status: acceptable  Post anesthesia nausea and vomiting:  none  Final Post Anesthesia Temperature Assessment:  Normothermia (36.0-37.5 degrees C)      INITIAL Post-op Vital signs:   Vitals Value Taken Time   /69 10/11/21 1410   Temp 36.5 °C (97.7 °F) 10/11/21 1358   Pulse 54 10/11/21 1413   Resp 16 10/11/21 1413   SpO2 98 % 10/11/21 1413   Vitals shown include unvalidated device data.

## 2021-10-11 NOTE — PERIOP NOTES
Olimpia Porras  1960  [id number]    Situation:  Verbal report given from: cecy matthews and mei hawk  Procedure: Procedure(s):  EXCISION 5CM RIGHT ABDOMINAL WALL MASS    Background:    Preoperative diagnosis: RIGHT ABDOMINAL WALL MASS    Postoperative diagnosis: RIGHT ABDOMINAL WALL MASS    :  Dr. Antonio Helton    Assistant(s): Circ-1: Carol Christensen RN  Scrub RN-1: Debi Cabral RN  Surg Asst-1: Venkat Sagastume    Specimens:   ID Type Source Tests Collected by Time Destination   1 : Right abdominal wall mass  Preservative Abdomen  Gely Cevallos MD 10/11/2021 1346 Pathology       Assessment:  Intra-procedure medications         Anesthesia gave intra-procedure sedation and medications, see anesthesia flow sheet     Intravenous fluids: LR@ KVO     Vital signs stable       Recommendation:    Permission to share finding with wife : yes

## 2021-10-11 NOTE — H&P
HISTORY OF PRESENT ILLNESS  Sade Dietz is a 64 y.o. male who comes in for consultation by Carlita Aranda MD for an abdominal wall mass  Mass  Pertinent negatives include no chest pain, no abdominal pain, no headaches and no shortness of breath. Follow-up  Pertinent negatives include no chest pain, no abdominal pain, no headaches and no shortness of breath.      He noted a lump in the right lower abdomen in March 2018. US suggested a lipoma at the time. He was going to have it removed but tore his quadraceps muscle. He now comes in to discuss removal.  It is not very painful but is bothersome and he has not had surgery in the area previously. It has gotten a little larger. He denies associated nausea, vomiting, diarrhea, constipation, melena, hematochezia, dysuria or hematuria.              Past Medical History:   Diagnosis Date    Diabetes ([address]      Hypertension      Hypothyroid              Past Surgical History:   Procedure Laterality Date    HX HEENT         dental implant    HX ORTHOPAEDIC Right 2012     achilles repair    HX ORTHOPAEDIC Right 2018     rt quadracep tendon repair    HX ORTHOPAEDIC Left 2020     Lt rotator cuff repair            Family History   Problem Relation Age of Onset    Breast Cancer Mother      Diabetes Father        Social History            Tobacco Use    Smoking status: Never Smoker    Smokeless tobacco: Never Used   Vaping Use    Vaping Use: Never used   Substance Use Topics    Alcohol use: Yes       Alcohol/week: 2.0 standard drinks       Types: 1 Glasses of wine, 1 Cans of beer per week    Drug use:  No           Current Outpatient Medications   Medication Sig    Jardiance 25 mg tablet TAKE 1 TABLET BY MOUTH EVERY DAY BEFORE BREAKFAST    liraglutide (Victoza 3-Mario Alberto) 0.6 mg/0.1 mL (18 mg/3 mL) pnij INJECT 1.8 MILLIGRAM SUBCUTANEOUSLY DAILY    glipiZIDE (GLUCOTROL) 10 mg tablet TAKE 1 TABLET  BY MOUTH  BEFORE BREAKFAST AND DINNER    levothyroxine (SYNTHROID) 150 mcg tablet TAKE 1 TABLET BY MOUTH  DAILY BEFORE BREAKFAST    atorvastatin (LIPITOR) 10 mg tablet      Insulin Needles, Disposable, (BD Ultra-Fine Micro Pen Needle) 32 gauge x 1/4\" ndle Use once with Victoza Pen once daily    glucose blood VI test strips (OneTouch Ultra Blue Test Strip) strip CHECK GLUCOSE 2 TIMES DAILY    meloxicam (MOBIC) 15 mg tablet      metFORMIN (GLUCOPHAGE) 1,000 mg tablet Take 1,000 mg by mouth two (2) times daily (with meals).  lisinopril-hydroCHLOROthiazide (PRINZIDE, ZESTORETIC) 20-12.5 mg per tablet Take 1 Tab by mouth daily.  albuterol (PROAIR HFA) 90 mcg/actuation inhaler Take 2 Puffs by inhalation every four (4) hours as needed for Wheezing.  loratadine (CLARITIN) 10 mg tablet Take 10 mg by mouth. (Patient not taking: Reported on 9/8/2021)    MULTIVIT-MINERALS/FA/LYCOPENE (ONE-A-DAY MEN'S PO) Take 1 Tab by mouth daily. (Patient not taking: Reported on 9/8/2021)    aspirin 81 mg chewable tablet Take 81 mg by mouth daily. (Patient not taking: Reported on 9/8/2021)      No current facility-administered medications for this visit.      No Known Allergies     Review of Systems   Constitutional: Negative for chills, diaphoresis, fever, malaise/fatigue and weight loss. HENT: Negative for congestion, ear pain and sore throat. Eyes: Negative for blurred vision and pain. Respiratory: Negative for cough, hemoptysis, sputum production, shortness of breath, wheezing and stridor. Cardiovascular: Negative for chest pain, palpitations, orthopnea, claudication, leg swelling and PND. Gastrointestinal: Negative for abdominal pain, blood in stool, constipation, diarrhea, heartburn, melena, nausea and vomiting. Genitourinary: Negative for dysuria, flank pain, frequency, hematuria and urgency. Musculoskeletal: Negative for back pain, joint pain, myalgias and neck pain. Skin: Negative for itching and rash.    Neurological: Negative for dizziness, tremors, focal weakness, seizures, weakness and headaches. Endo/Heme/Allergies: Negative for polydipsia. Psychiatric/Behavioral: Negative for depression and memory loss. The patient is not nervous/anxious.       Visit Vitals  BP (!) 143/72 (BP 1 Location: Left upper arm, BP Patient Position: Sitting)   Pulse 60   Temp 97.2 °F (36.2 °C) (Temporal)   Resp 20   Ht 6' (1.829 m)   Wt 105.1 kg (231 lb 9.6 oz)   SpO2 99%   BMI 31.41 kg/m²         Physical Exam  Constitutional:       General: He is not in acute distress. Appearance: Normal appearance. He is well-developed. He is not diaphoretic. HENT:      Head: Normocephalic and atraumatic. Mouth/Throat:      Pharynx: No oropharyngeal exudate. Eyes:      General: No scleral icterus. Conjunctiva/sclera: Conjunctivae normal.      Pupils: Pupils are equal, round, and reactive to light. Neck:      Thyroid: No thyromegaly. Trachea: No tracheal deviation. Cardiovascular:      Rate and Rhythm: Normal rate and regular rhythm. Heart sounds: Normal heart sounds. No murmur heard. No friction rub. No gallop. Pulmonary:      Effort: Pulmonary effort is normal. No respiratory distress. Breath sounds: Normal breath sounds. No stridor. No wheezing or rales. Abdominal:      General: Bowel sounds are normal. There is no distension. Palpations: Abdomen is soft. There is mass (4-5 cm soft deep subcutaneous mass in lower abdomen to right of midline). Tenderness: There is no abdominal tenderness. There is no guarding or rebound. Hernia: No hernia is present. There is no hernia in the ventral area or left inguinal area. Genitourinary:     Penis: Circumcised. Testes: Normal. Cremasteric reflex is present. Musculoskeletal:         General: No tenderness. Normal range of motion. Cervical back: Normal range of motion and neck supple. Lymphadenopathy:      Cervical: No cervical adenopathy.       Lower Body: No right inguinal adenopathy. No left inguinal adenopathy. Skin:     General: Skin is warm and dry. Findings: No erythema or rash. Neurological:      Mental Status: He is alert and oriented to person, place, and time. Cranial Nerves: No cranial nerve deficit. Coordination: Coordination normal.   Psychiatric:         Behavior: Behavior normal.         Thought Content: Thought content normal.         Judgment: Judgment normal.            ASSESSMENT and PLAN  1. Right lower abdominal mass feels like a lipoma or possibly an area of fat necrosis, less likely a hernia or malignancy. I explained to him about the anatomy and pathophysiology of these processes and options for close follow up, US, and excision. Risks of excision include, but are not limited to, bleeding, infection, poor healing/cosmesis, seroma/hematoma, DVT/PE  2. NIDDM type 2. Improved on current therapy  3. Obesity  BMI 34. Recommended exercise and weight loss        At this point he desires excision of a 5 cm abdominal wall mass under MAC as an outpatient      The patient was counseled at length about the risks of [place] Covid-19 in the noemy-operative and post-operative states including the recovery window of their procedure.  The patient was made aware that alex[place] Covid-19 after a surgical procedure may worsen their prognosis for recovering from the virus and lend to a higher morbidity and or mortality risk.  The patient was given the options of postponing their procedure. All of the risks, benefits, and alternatives were discussed.  The patient  wishes to proceed with the procedure.        Esperanza Pyle MD FACS

## 2021-10-11 NOTE — DISCHARGE INSTRUCTIONS
Discharge Instructions: Mass excision  Dr. Ronn Pineda    Call for appointment for follow up in 2 weeks [phone]    Activity:    Walk regularly. You may resume driving in 24 hours unless still requiring narcotics for pain. It is ok to use the arm on side of surgery but do not over do it. Work:    You may return to work in 1 to 2 days. Diet:    You may resume normal diet after 24 hours. Anesthesia and narcotics may cause nausea and vomiting. If persistent please call the office. Wound Care: You have a special dressing called Dermabond. It is okay to shower and let the water run over the incision but do not scrub the area or soak in a tub. If you have a small amount of drainage you may place a dry bandage over the wound and change it daily. If you experience a lot of drainage, develop redness around the wound, or a fever over 101 F occurs please call the office. Medications:    Resume home medications as indicated on the Medical Reconciliation form. Aspirin, Coumadin, and Plavix can be restarted on post operative day 2 if you were taking them preoperatively. Pain medications:  Non steroidal antiinflammatories seem to work best for post surgical pain. Try these first as prescribed. A narcotic prescription will also be given for breakthrough pain. Over the counter stool softeners and laxatives may be used if needed. Do not hesitate to call with questions or concerns. DO NOT TAKE TYLENOL/ACETAMINOPHEN WITH PERCOCET, [facility], [address] TAKE NARCOTIC PAIN MEDICATIONS WITH FOOD     Narcotics tend to be constipating, we suggest taking a stool softener such as Colace or Miralax (follow package instructions). DO NOT DRIVE WHILE TAKING NARCOTIC PAIN MEDICATIONS. DO NOT TAKE SLEEPING MEDICATIONS OR ANTIANXIETY MEDICATIONS WHILE TAKING NARCOTIC PAIN MEDICATIONS,  ESPECIALLY THE NIGHT OF ANESTHESIA!     CPAP PATIENTS BE SURE TO WEAR MACHINE WHENEVER NAPPING OR SLEEPING! DISCHARGE SUMMARY from Nurse    The following personal items collected during your admission are returned to you:   Dental Appliance: Dental Appliances: Other (comment) (Lower left implant)  Vision: Visual Aid: Glasses, With patient (Glasses in pt belonging bag)  Hearing Aid:    Jewelry: Jewelry: None  Clothing: Clothing: With patient  Other Valuables: Other Valuables: Cell Phone, With patient, Other (comment), Eyeglasses (Glasses in pt belonging bag, cell phone and ID cards in [facility])  Valuables sent to safe:        PATIENT INSTRUCTIONS:    After General Anesthesia or Intravenous Sedation, for 24 hours or while taking prescription Narcotics:        Someone should be with you for the next 24 hours. For your own safety, a responsible adult must drive you home. · Limit your activities  · Recommended activity: Rest today, up with assistance today. Do not climb stairs or shower unattended for the next 24 hours. · Please start with a soft bland diet and advance as tolerated (no nausea) to regular diet. · If you have a sore throat you should try the following: fluids, warm salt water gargles, or throat lozenges. If it does not improve after several days please follow up with your primary physician. · Do not drive and operate hazardous machinery  · Do not make important personal or business decisions  · Do  not drink alcoholic beverages  · If you have not urinated within 8 hours after discharge, please contact your surgeon on call.     Report the following to your surgeon:  · Excessive pain, swelling, redness or odor of or around the surgical area  · Temperature over 100.5  · Nausea and vomiting lasting longer than 4 hours or if unable to take medications  · Any signs of decreased circulation or nerve impairment to extremity: change in color, persistent  numbness, tingling, coldness or increase pain      · You will receive a Post Operative Call from one of the Recovery Room Nurses on the day after your surgery to check on you. It is very important for us to know how you are recovering after your surgery. If you have an issue or need to speak with someone, please call your surgeon, do not wait for the post operative call. · You may receive an e-mail or letter in the mail from [employer] regarding your experience with us in the Ambulatory Surgery Unit. Your feedback is valuable to us and we appreciate your participation in the survey. · If the above instructions are not adequate or you are having problems after your surgery, call the physician at their office number. · We wish you a speedy recovery ? What to do at Home:      *  Please give a list of your current medications to your Primary Care Provider. *  Please update this list whenever your medications are discontinued, doses are      changed, or new medications (including over-the-counter products) are added. *  Please carry medication information at all times in case of emergency situations. If you have not received your influenza and/or pneumococcal vaccine, please follow up with your primary care physician. The discharge information has been reviewed with the patient and caregiver. The patient and caregiver verbalized understanding.

## 2021-10-11 NOTE — ANESTHESIA PREPROCEDURE EVALUATION
Relevant Problems   ENDOCRINE   (+) Non-insulin dependent type 2 diabetes mellitus (HCC)       Anesthetic History   No history of anesthetic complications            Review of Systems / Medical History  Patient summary reviewed, nursing notes reviewed and pertinent labs reviewed    Pulmonary  Within defined limits                 Neuro/Psych   Within defined limits           Cardiovascular  Within defined limits                Exercise tolerance: >4 METS     GI/Hepatic/Renal  Within defined limits              Endo/Other    Diabetes: type 2  Hypothyroidism: well controlled       Other Findings   Comments: Right abdominal wall mass           Physical Exam    Airway  Mallampati: II  TM Distance: 4 - 6 cm  Neck ROM: normal range of motion   Mouth opening: Normal     Cardiovascular    Rhythm: regular          Comments: bradycardia Dental    Dentition: Caps/crowns  Comments:  On molars   Pulmonary  Breath sounds clear to auscultation               Abdominal  GI exam deferred       Other Findings            Anesthetic Plan    ASA: 2  Anesthesia type: MAC          Induction: Intravenous  Anesthetic plan and risks discussed with: Patient

## 2021-10-11 NOTE — PERIOP NOTES
Permission received to review discharge instructions and discuss private health information with wife, Sylvia Harper. Patient states that wife will be with them for at least 24 hours following today's procedure. Mistral-Air warming blanket applied at this time. Set to appropriate setting that is comfortable to patient. Will continue to monitor.

## 2021-10-11 NOTE — OP NOTES
[address]  OPERATIVE REPORT    Name:  Albaro Gee  MR#:  [id number]  :  1960  ACCOUNT #:  [de-identified]  DATE OF SERVICE:  10/11/2021    PREOPERATIVE DIAGNOSIS:  Abdominal wall mass, 5 cm. POSTOPERATIVE DIAGNOSIS:  Abdominal wall mass, 5 cm. PROCEDURE PERFORMED:  Excision of 5 cm abdominal wall mass. SURGEON:  Kasia Alanis MD    ASSISTANT:   Violeta Hernández. ANESTHESIA:  MAC.    COMPLICATIONS:  None. SPECIMENS REMOVED:  Right abdominal wall mass. IMPLANTS:  None. ESTIMATED BLOOD LOSS:  5 mL. FINDINGS:  Fatty mass versus fat necrosis. BRIEF HISTORY:  The patient is a 19-year-old gentleman with symptomatic and tender abdominal wall mass. It has been present for a while and getting worse. Elected to have the area excised. He understands the risks and benefits and wished to proceed. These included, but are not limited to bleeding, infection, seroma, recurrence, poor cosmesis, healing, need for local wound care as well as risk of general anesthesia, DVT and pulmonary emboli, cardiac and pulmonary events and stroke. PROCEDURE:  The patient was taken to the operating room, placed on the operating table in the supine position, underwent IV sedation, and the abdominal wall was prepped and draped in the usual sterile fashion. After appropriate time-out and antibiotics were given, the subcutaneous tissue with 1% lidocaine with epinephrine in an elliptical fashion and transverse loops over the area. Then, utilizing sharp dissection with electrocautery, we took a wide breadth tissue around the area all the way down to the fascia. It does not look like bone or fat necrosis, but there was a different hue to it; unclear whether this was lipoma or something more. Excised the entire area.   There was no evidence of hernia in this area, and then interrupted 3-0 Vicryl was used to close the deep tissues and deep dermis, running 4-0 Vicryl was used to close the skin and Dermabond dressing was applied. Upon completion of the operation, the needle, sponge, and instruments were correct x2. The patient tolerated the procedure well and was brought to recovery room.         Genny Etienne MD      [id number]  D:  10/11/2021 13:54  T:  10/11/2021 18:21  JOB #:  [id number]  CC:  Omelia Joiner, MD Olympia Lesches, MD

## 2021-10-11 NOTE — BRIEF OP NOTE
Brief Postoperative Note    Patient: Fariba Borjas  YOB: 1960  MRN: [id number]    Date of Procedure: 10/11/2021     Pre-Op Diagnosis: RIGHT ABDOMINAL WALL MASS    Post-Op Diagnosis: Same as preoperative diagnosis.       Procedure(s):  EXCISION 5CM RIGHT ABDOMINAL WALL MASS    Surgeon(s):  Ashley Downs MD    Surgical Assistant: Surg Asst-1: Tanya Medina    Anesthesia: MAC     Estimated Blood Loss (mL): Minimal    Complications: None    Specimens:   ID Type Source Tests Collected by Time Destination   1 : Right abdominal wall mass  Preservative Abdomen  Ashley Downs MD 10/11/2021 1346 Pathology        Implants: * No implants in log *    Drains: * No LDAs found *    Findings: fatty mass ?fat necrosis    Electronically Signed by Chyna Miller MD on 10/11/2021 at 1:51 PM

## 2021-10-11 NOTE — PERIOP NOTES
1415 pt awake and alert, oriented x4. VSS. Taking po fluids without diff. Ice pack applied to abd.  1424 Dr. Sam Moreno at bedside to speak with pt.    1430 discharge instructions reviewed with wife by phone. Verbalized understanding. 1444 IV d/c'd without incident. Assisted pt dressing. To wheelchair with minimal assist.  Pt remains A+Ox4, denies complaints. Discharged to car via wheelchair. All belongings with pt and wife. Home with wife.

## 2021-10-18 RX ORDER — IBUPROFEN 800 MG/1
TABLET ORAL
Qty: 30 TABLET | Refills: 0 | Status: SHIPPED | OUTPATIENT
Start: 2021-10-18 | End: 2022-05-20

## 2021-10-29 ENCOUNTER — OFFICE VISIT (OUTPATIENT)
Dept: SURGERY | Age: 61
End: 2021-10-29
Payer: COMMERCIAL

## 2021-10-29 VITALS
HEART RATE: 56 BPM | BODY MASS INDEX: 30.95 KG/M2 | RESPIRATION RATE: 16 BRPM | DIASTOLIC BLOOD PRESSURE: 70 MMHG | WEIGHT: 228.5 LBS | OXYGEN SATURATION: 98 % | TEMPERATURE: 97.3 F | SYSTOLIC BLOOD PRESSURE: 132 MMHG | HEIGHT: 72 IN

## 2021-10-29 DIAGNOSIS — Z09 POSTOPERATIVE EXAMINATION: Primary | ICD-10-CM

## 2021-10-29 PROCEDURE — 99024 POSTOP FOLLOW-UP VISIT: CPT | Performed by: SURGERY

## 2021-10-29 RX ORDER — TRIAMCINOLONE ACETONIDE 1 MG/G
CREAM TOPICAL
COMMUNITY
Start: 2021-10-12

## 2021-10-29 RX ORDER — MELOXICAM 15 MG/1
15 TABLET ORAL DAILY
COMMUNITY
Start: 2021-10-11

## 2021-10-29 NOTE — LETTER
10/29/2021    Patient: Abdi Barry   YOB: 1960   Date of Visit: 10/29/2021     Nilo Hood, [address]  [address] [address]  Via Fax: [phone]    Dear Nilo Hood MD,      Thank you for referring Mr. Abdi Barry to [address] for evaluation. My notes for this consultation are attached. If you have questions, please do not hesitate to call me. I look forward to following your patient along with you.       Sincerely,    Alejandro Armenta MD

## 2021-10-29 NOTE — PROGRESS NOTES
Identified pt with two pt identifiers(name and ). Reviewed record in preparation for visit and have obtained necessary documentation. All patient medications has been reviewed. Chief Complaint   Patient presents with   [facility] Surgical Follow-up     s/p Excision of 5 cm abdominal wall mass on 10/11/2021       Health Maintenance Due   Topic    Hepatitis C Screening     DTaP/Tdap/Td series (1 - Tdap)    Colorectal Cancer Screening Combo     Shingrix Vaccine Age 50> (1 of 2)    MICROALBUMIN Q1     Foot Exam Q1     Eye Exam Retinal or Dilated     A1C test (Diabetic or Prediabetic)     Lipid Screen     Flu Vaccine (1)       Vitals:    10/29/21 1145   BP: 132/70   Pulse: (!) 56   Resp: 16   Temp: 97.3 °F (36.3 °C)   TempSrc: Temporal   SpO2: 98%   Weight: 103.6 kg (228 lb 8 oz)   Height: 6' (1.829 m)   PainSc:   0 - No pain       4. Have you been to the ER, urgent care clinic since your last visit? Hospitalized since your last visit? No    5. Have you seen or consulted any other health care providers outside of the [address] [place] [address] since your last visit? Include any pap smears or colon screening. Dr Cesar Mendiola 10/29/21 for Dupuytrens      Patient is accompanied by self I have received verbal consent from Saritha Verma to discuss any/all medical information while they are present in the room.

## 2021-10-29 NOTE — PROGRESS NOTES
Surgery  Follow up  Procedure: excision abdominal wall mass  OR date:  10/11/2021  Path:    Soft tissue, right abdominal wall, excision:        Fibroadipose tissue with foci of fat necrosis and scattered giant   cells        No malignancy identified        See comment   Comment  [place] red stain is negative for amyloid.  The etiology of the lesion is   not evident.  Consider prior surgical site or injection site.  Clinical   correlation recommended.  Case also reviewed by Vj Patricio and Saba martines'Griselda   who concur with the above interpretation     S I feel fine, no drainage or pain  Visit Vitals  /70 (BP 1 Location: Left upper arm, BP Patient Position: Sitting)   Pulse (!) 56   Temp 97.3 °F (36.3 °C) (Temporal)   Resp 16   Ht 6' (1.829 m)   Wt 103.6 kg (228 lb 8 oz)   SpO2 98%   BMI 30.99 kg/m²       O Incisions healing well without infection   Small/medium seroma    A/P Doing well   RTC prn    Joey Browning MD FACS

## 2021-12-17 ENCOUNTER — TELEPHONE (OUTPATIENT)
Dept: ENDOCRINOLOGY | Age: 61
End: 2021-12-17

## 2021-12-17 NOTE — TELEPHONE ENCOUNTER
----- Message from Katie Navarro sent at 12/14/2021  4:22 PM EST -----  Regarding: billing question  I got a call from patient who stated that he is dealing with a billing dispute since November 2020. Its concerning a virtual visit from the 21st of November. He has been trying to get it resolved with the billing dept since then with no success.  He would like a phone call back at [phone]- thanks

## 2021-12-17 NOTE — TELEPHONE ENCOUNTER
12/17/2021  3:57 PM    Good Afternoon,    I called the patient and left a voice mail message informing that I had been out of the office and that he could call me back I would be here today until 5pm.    I have also sent a [employer] message informing that I have reached out to a contact billing person to see how we could get the matter corrected and I will inform him once I have heard back from someone.     Thanks  [place]

## 2022-03-18 PROBLEM — R19.03 ABDOMINAL WALL MASS OF RIGHT LOWER QUADRANT: Status: ACTIVE | Noted: 2018-03-28

## 2022-03-18 PROBLEM — E11.9 NON-INSULIN DEPENDENT TYPE 2 DIABETES MELLITUS (HCC): Status: ACTIVE | Noted: 2018-03-28

## 2022-03-19 PROBLEM — E66.9 OBESITY (BMI 30.0-34.9): Status: ACTIVE | Noted: 2018-03-28

## 2022-03-19 PROBLEM — E66.811 OBESITY (BMI 30.0-34.9): Status: ACTIVE | Noted: 2018-03-28

## 2022-05-10 ENCOUNTER — OFFICE VISIT (OUTPATIENT)
Dept: SURGERY | Age: 62
End: 2022-05-10
Payer: COMMERCIAL

## 2022-05-10 VITALS
SYSTOLIC BLOOD PRESSURE: 122 MMHG | DIASTOLIC BLOOD PRESSURE: 72 MMHG | BODY MASS INDEX: 30.88 KG/M2 | RESPIRATION RATE: 16 BRPM | TEMPERATURE: 97.3 F | OXYGEN SATURATION: 99 % | WEIGHT: 228 LBS | HEART RATE: 70 BPM | HEIGHT: 72 IN

## 2022-05-10 DIAGNOSIS — R19.00 RETROPERITONEAL MASS: Primary | ICD-10-CM

## 2022-05-10 PROCEDURE — 99215 OFFICE O/P EST HI 40 MIN: CPT | Performed by: SURGERY

## 2022-05-10 RX ORDER — SEMAGLUTIDE 1.34 MG/ML
1 INJECTION, SOLUTION SUBCUTANEOUS
COMMUNITY
Start: 2022-02-07

## 2022-05-10 NOTE — LETTER
5/10/2022    Patient: Naga Bond   YOB: 1960   Date of Visit: 5/10/2022     [address]   [address]  [address] [address]  Via Fax: Agusto Souza [address] [facility]  [address]  [address] [address]  Via Fax: [phone]    Dear MD Yudy Townsend MD,      Thank you for referring Mr. Naga Bond to  [facility] for evaluation. My notes for this consultation are attached. If you have questions, please do not hesitate to call me. I look forward to following your patient along with you.       Sincerely,    Carlitos Welch MD

## 2022-05-10 NOTE — PROGRESS NOTES
HISTORY OF PRESENT ILLNESS  Sanjiv Son is a 64 y.o. male who comes in for consultation by Brit Browne MD and LETICIA Meza NP for a retroperitoneal mass  HPI  He was on a trip to [place] and developed left back pain. He went to the ER in [place], [place] and had a CT 5/6/2022 demonstrating a 6.7 x 6.8 x 7.3 cm paraaortic soft tissue mass encasing the left renal artery and partially encasing the aorta with left renal vein compression. There were two other lesions 3.7 x 2.5 x 4.0 cm and 1.7 x 1.2 x 1.3 cm inferior to the main lesion. There were no other concerning areas except for a soft tissue density in the lower abdomen. He has had 20 pound weight loss over the last 3-6 months. It was thought to be due to a change in his diabetic medications. He denies fever, chills, night sweats, fatigue, or malaise. Past Medical History:   Diagnosis Date    Diabetes ([address]     Hypothyroid      Past Surgical History:   Procedure Laterality Date    HX HEENT      dental implant    HX ORTHOPAEDIC Right 2012    achilles repair    HX ORTHOPAEDIC Right 2018    rt quadracep tendon repair    HX ORTHOPAEDIC Left 2020    Lt rotator cuff repair     Family History   Problem Relation Age of Onset    Breast Cancer Mother     Diabetes Father      Social History     Tobacco Use    Smoking status: Never Smoker    Smokeless tobacco: Never Used   Vaping Use    Vaping Use: Never used   Substance Use Topics    Alcohol use: Yes     Alcohol/week: 2.0 standard drinks     Types: 1 Glasses of wine, 1 Cans of beer per week    Drug use: No     Current Outpatient Medications   Medication Sig    semaglutide (Ozempic) 1 mg/dose (4 mg/3 mL) pnij     meloxicam (MOBIC) 15 mg tablet Take 15 mg by mouth daily.  triamcinolone acetonide (KENALOG) 0.1 % topical cream APPLY THIN LAYER TOPICALLY TO THE AFFECTED AREA THREE TIMES DAILY    lisinopriL (PRINIVIL, ZESTRIL) 10 mg tablet Take 1 Tablet by mouth daily.  Indications: kidney protection in diabetes    Jardiance 25 mg tablet TAKE 1 TABLET BY MOUTH EVERY DAY BEFORE BREAKFAST (Patient taking differently: Take 25 mg by mouth daily.)    liraglutide (Victoza 3-Mario Alberto) 0.6 mg/0.1 mL (18 mg/3 mL) pnij INJECT 1.8 MILLIGRAM SUBCUTANEOUSLY DAILY    glipiZIDE (GLUCOTROL) 10 mg tablet TAKE 1 TABLET  BY MOUTH  BEFORE BREAKFAST AND DINNER    levothyroxine (SYNTHROID) 150 mcg tablet TAKE 1 TABLET BY MOUTH  DAILY BEFORE BREAKFAST (Patient taking differently: nightly.)    atorvastatin (LIPITOR) 20 mg tablet Take 20 mg by mouth nightly.  Insulin Needles, Disposable, (BD Ultra-Fine Micro Pen Needle) 32 gauge x 1/4\" ndle Use once with Victoza Pen once daily    glucose blood VI test strips (OneTouch Ultra Blue Test Strip) strip CHECK GLUCOSE 2 TIMES DAILY    loratadine (CLARITIN) 10 mg tablet Take 10 mg by mouth.  MULTIVIT-MINERALS/FA/LYCOPENE (ONE-A-DAY MEN'S PO) Take 1 Tablet by mouth daily.  metFORMIN (GLUCOPHAGE) 1,000 mg tablet Take 1,000 mg by mouth two (2) times daily (with meals).  ibuprofen (MOTRIN) 800 mg tablet TAKE 1 TABLET BY MOUTH EVERY 8 HOURS FOR UP TO 10 DAYS AS NEEDED FOR PAIN     No current facility-administered medications for this visit. No Known Allergies    Review of Systems   Constitutional: Negative for chills, diaphoresis, fever, malaise/fatigue and weight loss. HENT: Negative for congestion, ear pain and sore throat. Eyes: Negative for blurred vision and pain. Respiratory: Negative for cough, hemoptysis, sputum production, shortness of breath, wheezing and stridor. Cardiovascular: Negative for chest pain, palpitations, orthopnea, claudication, leg swelling and PND. Gastrointestinal: Positive for abdominal pain. Negative for blood in stool, constipation, diarrhea, heartburn, melena, nausea and vomiting. Genitourinary: Negative for dysuria, flank pain, frequency, hematuria and urgency.    Musculoskeletal: Negative for back pain, joint pain, myalgias and neck pain. Skin: Negative for itching and rash. Neurological: Negative for dizziness, tremors, focal weakness, seizures, weakness and headaches. Endo/Heme/Allergies: Negative for polydipsia. Psychiatric/Behavioral: Negative for depression and memory loss. The patient is not nervous/anxious. Visit Vitals  /72 (BP 1 Location: Left upper arm, BP Patient Position: Sitting, BP Cuff Size: Adult)   Pulse 70   Temp 97.3 °F (36.3 °C) (Temporal)   Resp 16   Ht 6' (1.829 m)   Wt 103.4 kg (228 lb)   SpO2 99%   BMI 30.92 kg/m²       Physical Exam  Constitutional:       General: He is not in acute distress. Appearance: Normal appearance. He is well-developed. He is not diaphoretic. HENT:      Head: Normocephalic and atraumatic. Mouth/Throat:      Pharynx: No oropharyngeal exudate. Eyes:      General: No scleral icterus. Conjunctiva/sclera: Conjunctivae normal.      Pupils: Pupils are equal, round, and reactive to light. Neck:      Thyroid: No thyromegaly. Trachea: No tracheal deviation. Cardiovascular:      Rate and Rhythm: Normal rate and regular rhythm. Heart sounds: Normal heart sounds. No murmur heard. No friction rub. No gallop. Pulmonary:      Effort: Pulmonary effort is normal. No respiratory distress. Breath sounds: Normal breath sounds. No stridor. No wheezing or rales. Chest:   Breasts:      Right: No axillary adenopathy or supraclavicular adenopathy. Left: No axillary adenopathy or supraclavicular adenopathy. Abdominal:      General: Bowel sounds are normal. There is no distension. Palpations: Abdomen is soft. There is no mass. Tenderness: There is no abdominal tenderness. There is no guarding or rebound. Hernia: No hernia is present. There is no hernia in the umbilical area or ventral area. Genitourinary:     Penis: Circumcised. Testes: Normal. Cremasteric reflex is present.    Musculoskeletal:         General: No tenderness. Normal range of motion. Cervical back: Normal range of motion and neck supple. Lymphadenopathy:      Cervical: No cervical adenopathy. Right cervical: No superficial cervical adenopathy. Left cervical: No superficial cervical adenopathy. Upper Body:      Right upper body: No supraclavicular or axillary adenopathy. Left upper body: No supraclavicular or axillary adenopathy. Lower Body: No right inguinal adenopathy. No left inguinal adenopathy. Skin:     General: Skin is warm and dry. Findings: No erythema or rash. Neurological:      Mental Status: He is alert and oriented to person, place, and time. Cranial Nerves: No cranial nerve deficit. Coordination: Coordination normal.   Psychiatric:         Behavior: Behavior normal.         Thought Content: Thought content normal.         Judgment: Judgment normal.       I reviewed his CT images and feel that there is a window for IR to perform a CT guided biopsy of the lesion    ASSESSMENT and PLAN  1. Retroperitoneal/paraaortic mass. I explained to him and his wife about the anatomy and pathophysiology of retroperitoneal masses and given vessel encasement the high likelihood of malignancy. There is no other solid organ disease in the abd/pelvis. Hopefully this is a lymphoma but may be metastatic from other sites. They are not resectable. 2.   Abdominal wall process. This is in the area I resected last year and was fat necrosis. ?related to prior medication injection  3. NIDDM type 2. Good glucose control per patient. Last HgbA1c 7 per patient  4. Essential hypertension. Stable on rx  5. Hypercholesterolemia. Improved on statin  6. Hypothyroidism. On levothyroxine  7.   Osteoarthritis improved on Mobic    At this point I feel that he needs CT guided biopsy of the mass    He ultimately will need complete metastatic work up with either CT chest/bone scan or PET but will try to get a dx first Amor Francisco MD FACS

## 2022-05-10 NOTE — PROGRESS NOTES
Identified pt with two pt identifiers(name and ). Reviewed record in preparation for visit and have obtained necessary documentation. All patient medications has been reviewed. Chief Complaint   Patient presents with    Mass     Seen at the request of Janine Vázquez for eval retroperitoneal mass lesions       Health Maintenance Due   Topic    Hepatitis C Screening     Pneumococcal 0-64 years (1 - PCV)    DTaP/Tdap/Td series (1 - Tdap)    Colorectal Cancer Screening Combo     Shingrix Vaccine Age 50> (1 of 2)    MICROALBUMIN Q1     Foot Exam Q1     Eye Exam Retinal or Dilated     A1C test (Diabetic or Prediabetic)     Lipid Screen     COVID-19 Vaccine (3 - Booster for Moderna series)       Vitals:    05/10/22 1136   BP: 122/72   Pulse: 70   Resp: 16   Temp: 97.3 °F (36.3 °C)   TempSrc: Temporal   SpO2: 99%   Weight: 103.4 kg (228 lb)   Height: 6' (1.829 m)   PainSc:   0 - No pain       4. Have you been to the ER, urgent care clinic since your last visit? Hospitalized since your last visit? No    5. Have you seen or consulted any other health care providers outside of the [address] [place] [address] since your last visit? Include any pap smears or colon screening. No      Patient is accompanied by spouse I have received verbal consent from Jarod Macdonald to discuss any/all medical information while they are present in the room.

## 2022-05-10 NOTE — H&P (VIEW-ONLY)
HISTORY OF PRESENT ILLNESS  Luis A Miranda is a 64 y.o. male who comes in for consultation by Thao Marie MD and LETICIA Meza NP for a retroperitoneal mass  HPI  He was on a trip to [place] and developed left back pain. He went to the ER in [place], [place] and had a CT 5/6/2022 demonstrating a 6.7 x 6.8 x 7.3 cm paraaortic soft tissue mass encasing the left renal artery and partially encasing the aorta with left renal vein compression. There were two other lesions 3.7 x 2.5 x 4.0 cm and 1.7 x 1.2 x 1.3 cm inferior to the main lesion. There were no other concerning areas except for a soft tissue density in the lower abdomen. He has had 20 pound weight loss over the last 3-6 months. It was thought to be due to a change in his diabetic medications. He denies fever, chills, night sweats, fatigue, or malaise. Past Medical History:   Diagnosis Date    Diabetes ([address]     Hypothyroid      Past Surgical History:   Procedure Laterality Date    HX HEENT      dental implant    HX ORTHOPAEDIC Right 2012    achilles repair    HX ORTHOPAEDIC Right 2018    rt quadracep tendon repair    HX ORTHOPAEDIC Left 2020    Lt rotator cuff repair     Family History   Problem Relation Age of Onset    Breast Cancer Mother     Diabetes Father      Social History     Tobacco Use    Smoking status: Never Smoker    Smokeless tobacco: Never Used   Vaping Use    Vaping Use: Never used   Substance Use Topics    Alcohol use: Yes     Alcohol/week: 2.0 standard drinks     Types: 1 Glasses of wine, 1 Cans of beer per week    Drug use: No     Current Outpatient Medications   Medication Sig    semaglutide (Ozempic) 1 mg/dose (4 mg/3 mL) pnij     meloxicam (MOBIC) 15 mg tablet Take 15 mg by mouth daily.  triamcinolone acetonide (KENALOG) 0.1 % topical cream APPLY THIN LAYER TOPICALLY TO THE AFFECTED AREA THREE TIMES DAILY    lisinopriL (PRINIVIL, ZESTRIL) 10 mg tablet Take 1 Tablet by mouth daily.  Indications: kidney protection in diabetes    Jardiance 25 mg tablet TAKE 1 TABLET BY MOUTH EVERY DAY BEFORE BREAKFAST (Patient taking differently: Take 25 mg by mouth daily.)    liraglutide (Victoza 3-Mario Alberto) 0.6 mg/0.1 mL (18 mg/3 mL) pnij INJECT 1.8 MILLIGRAM SUBCUTANEOUSLY DAILY    glipiZIDE (GLUCOTROL) 10 mg tablet TAKE 1 TABLET  BY MOUTH  BEFORE BREAKFAST AND DINNER    levothyroxine (SYNTHROID) 150 mcg tablet TAKE 1 TABLET BY MOUTH  DAILY BEFORE BREAKFAST (Patient taking differently: nightly.)    atorvastatin (LIPITOR) 20 mg tablet Take 20 mg by mouth nightly.  Insulin Needles, Disposable, (BD Ultra-Fine Micro Pen Needle) 32 gauge x 1/4\" ndle Use once with Victoza Pen once daily    glucose blood VI test strips (OneTouch Ultra Blue Test Strip) strip CHECK GLUCOSE 2 TIMES DAILY    loratadine (CLARITIN) 10 mg tablet Take 10 mg by mouth.  MULTIVIT-MINERALS/FA/LYCOPENE (ONE-A-DAY MEN'S PO) Take 1 Tablet by mouth daily.  metFORMIN (GLUCOPHAGE) 1,000 mg tablet Take 1,000 mg by mouth two (2) times daily (with meals).  ibuprofen (MOTRIN) 800 mg tablet TAKE 1 TABLET BY MOUTH EVERY 8 HOURS FOR UP TO 10 DAYS AS NEEDED FOR PAIN     No current facility-administered medications for this visit. No Known Allergies    Review of Systems   Constitutional: Negative for chills, diaphoresis, fever, malaise/fatigue and weight loss. HENT: Negative for congestion, ear pain and sore throat. Eyes: Negative for blurred vision and pain. Respiratory: Negative for cough, hemoptysis, sputum production, shortness of breath, wheezing and stridor. Cardiovascular: Negative for chest pain, palpitations, orthopnea, claudication, leg swelling and PND. Gastrointestinal: Positive for abdominal pain. Negative for blood in stool, constipation, diarrhea, heartburn, melena, nausea and vomiting. Genitourinary: Negative for dysuria, flank pain, frequency, hematuria and urgency.    Musculoskeletal: Negative for back pain, joint pain, myalgias and neck pain. Skin: Negative for itching and rash. Neurological: Negative for dizziness, tremors, focal weakness, seizures, weakness and headaches. Endo/Heme/Allergies: Negative for polydipsia. Psychiatric/Behavioral: Negative for depression and memory loss. The patient is not nervous/anxious. Visit Vitals  /72 (BP 1 Location: Left upper arm, BP Patient Position: Sitting, BP Cuff Size: Adult)   Pulse 70   Temp 97.3 °F (36.3 °C) (Temporal)   Resp 16   Ht 6' (1.829 m)   Wt 103.4 kg (228 lb)   SpO2 99%   BMI 30.92 kg/m²       Physical Exam  Constitutional:       General: He is not in acute distress. Appearance: Normal appearance. He is well-developed. He is not diaphoretic. HENT:      Head: Normocephalic and atraumatic. Mouth/Throat:      Pharynx: No oropharyngeal exudate. Eyes:      General: No scleral icterus. Conjunctiva/sclera: Conjunctivae normal.      Pupils: Pupils are equal, round, and reactive to light. Neck:      Thyroid: No thyromegaly. Trachea: No tracheal deviation. Cardiovascular:      Rate and Rhythm: Normal rate and regular rhythm. Heart sounds: Normal heart sounds. No murmur heard. No friction rub. No gallop. Pulmonary:      Effort: Pulmonary effort is normal. No respiratory distress. Breath sounds: Normal breath sounds. No stridor. No wheezing or rales. Chest:   Breasts:      Right: No axillary adenopathy or supraclavicular adenopathy. Left: No axillary adenopathy or supraclavicular adenopathy. Abdominal:      General: Bowel sounds are normal. There is no distension. Palpations: Abdomen is soft. There is no mass. Tenderness: There is no abdominal tenderness. There is no guarding or rebound. Hernia: No hernia is present. There is no hernia in the umbilical area or ventral area. Genitourinary:     Penis: Circumcised. Testes: Normal. Cremasteric reflex is present.    Musculoskeletal:         General: No tenderness. Normal range of motion. Cervical back: Normal range of motion and neck supple. Lymphadenopathy:      Cervical: No cervical adenopathy. Right cervical: No superficial cervical adenopathy. Left cervical: No superficial cervical adenopathy. Upper Body:      Right upper body: No supraclavicular or axillary adenopathy. Left upper body: No supraclavicular or axillary adenopathy. Lower Body: No right inguinal adenopathy. No left inguinal adenopathy. Skin:     General: Skin is warm and dry. Findings: No erythema or rash. Neurological:      Mental Status: He is alert and oriented to person, place, and time. Cranial Nerves: No cranial nerve deficit. Coordination: Coordination normal.   Psychiatric:         Behavior: Behavior normal.         Thought Content: Thought content normal.         Judgment: Judgment normal.       I reviewed his CT images and feel that there is a window for IR to perform a CT guided biopsy of the lesion    ASSESSMENT and PLAN  1. Retroperitoneal/paraaortic mass. I explained to him and his wife about the anatomy and pathophysiology of retroperitoneal masses and given vessel encasement the high likelihood of malignancy. There is no other solid organ disease in the abd/pelvis. Hopefully this is a lymphoma but may be metastatic from other sites. They are not resectable. 2.   Abdominal wall process. This is in the area I resected last year and was fat necrosis. ?related to prior medication injection  3. NIDDM type 2. Good glucose control per patient. Last HgbA1c 7 per patient  4. Essential hypertension. Stable on rx  5. Hypercholesterolemia. Improved on statin  6. Hypothyroidism. On levothyroxine  7.   Osteoarthritis improved on Mobic    At this point I feel that he needs CT guided biopsy of the mass    He ultimately will need complete metastatic work up with either CT chest/bone scan or PET but will try to get a dx first Tete Ramirez MD FACS

## 2022-05-12 ENCOUNTER — HOSPITAL ENCOUNTER (OUTPATIENT)
Dept: CT IMAGING | Age: 62
Discharge: HOME | End: 2022-05-12
Attending: SURGERY
Payer: COMMERCIAL

## 2022-05-12 VITALS
HEIGHT: 72 IN | WEIGHT: 222 LBS | HEART RATE: 61 BPM | BODY MASS INDEX: 30.07 KG/M2 | DIASTOLIC BLOOD PRESSURE: 76 MMHG | TEMPERATURE: 98.1 F | OXYGEN SATURATION: 100 % | RESPIRATION RATE: 16 BRPM | SYSTOLIC BLOOD PRESSURE: 127 MMHG

## 2022-05-12 DIAGNOSIS — R19.00 RETROPERITONEAL MASS: ICD-10-CM

## 2022-05-12 PROCEDURE — 88341 IMHCHEM/IMCYTCHM EA ADD ANTB: CPT

## 2022-05-12 PROCEDURE — 88305 TISSUE EXAM BY PATHOLOGIST: CPT

## 2022-05-12 PROCEDURE — 88184 FLOWCYTOMETRY/ TC 1 MARKER: CPT

## 2022-05-12 PROCEDURE — 88342 IMHCHEM/IMCYTCHM 1ST ANTB: CPT

## 2022-05-12 PROCEDURE — 49180 BIOPSY ABDOMINAL MASS: CPT

## 2022-05-12 PROCEDURE — 74011250636 HC RX REV CODE- 250/636: Performed by: STUDENT IN AN ORGANIZED HEALTH CARE EDUCATION/TRAINING PROGRAM

## 2022-05-12 PROCEDURE — 2709999900 HC NON-CHARGEABLE SUPPLY

## 2022-05-12 PROCEDURE — 88333 PATH CONSLTJ SURG CYTO XM 1: CPT

## 2022-05-12 PROCEDURE — 77012 CT SCAN FOR NEEDLE BIOPSY: CPT

## 2022-05-12 PROCEDURE — 88185 FLOWCYTOMETRY/TC ADD-ON: CPT

## 2022-05-12 RX ORDER — FENTANYL CITRATE 50 UG/ML
100 INJECTION, SOLUTION INTRAMUSCULAR; INTRAVENOUS
Status: DISCONTINUED | OUTPATIENT
Start: 2022-05-12 | End: 2022-05-12

## 2022-05-12 RX ORDER — SODIUM CHLORIDE 9 MG/ML
25 INJECTION, SOLUTION INTRAVENOUS CONTINUOUS
Status: DISCONTINUED | OUTPATIENT
Start: 2022-05-12 | End: 2022-05-12

## 2022-05-12 RX ORDER — MIDAZOLAM HYDROCHLORIDE 1 MG/ML
5 INJECTION, SOLUTION INTRAMUSCULAR; INTRAVENOUS
Status: DISCONTINUED | OUTPATIENT
Start: 2022-05-12 | End: 2022-05-12

## 2022-05-12 RX ADMIN — MIDAZOLAM HYDROCHLORIDE 2 MG: 1 INJECTION, SOLUTION INTRAMUSCULAR; INTRAVENOUS at 08:35

## 2022-05-12 RX ADMIN — FENTANYL CITRATE 25 MCG: 50 INJECTION, SOLUTION INTRAMUSCULAR; INTRAVENOUS at 08:45

## 2022-05-12 RX ADMIN — FENTANYL CITRATE 50 MCG: 50 INJECTION, SOLUTION INTRAMUSCULAR; INTRAVENOUS at 08:30

## 2022-05-12 RX ADMIN — SODIUM CHLORIDE 25 ML/HR: 9 INJECTION, SOLUTION INTRAVENOUS at 08:11

## 2022-05-12 NOTE — PROCEDURES
Interventional Radiology  Procedure Note        5/12/2022 9:12 AM    Patient: Luna Adkins     Informed consent obtained    Diagnosis: Retroperitoneal mass    Procedure(s): CT guided biopsy of left retroperitoneal mass    Specimens removed:  5x 18ga core samples    Complications: None    Primary Physician: Oriana Martinez MD    Recommendations: N/A    Discharge Disposition: Stable; discharge to home    Full dictated report to follow    Oriana Martinez MD  Interventional Radiology  [facility]  9:12 AM, 5/12/2022

## 2022-05-12 NOTE — DISCHARGE INSTRUCTIONS
[facility]  Special Procedures/Radiology Department    Radiologist:    [facility]    Date:    5/12/2022    Biopsy Discharge Instructions    You may have an aching pain in the biopsy site tonight. You may Tylenol, as directed on the label, for pain or discomfort. Avoid ibuprofen (Advil, Motrin) and aspirin for the next 48 hours as these drugs may cause you to bleed. Watch for bleeding from the biopsy site. Hold pressure to the area for at least 15 minutes if bleeding does occur. If you have severe pain or swelling at the site, go directly to the nearest Emergency Room. Watch for signs of infection:  redness, pain, pus, drainage, fever or chills. If this occurs, call your doctor. Resume your previous diet and follow the medication reconciliation form. Rest the remainder of today. Do not lift anything heavier than a small grocery bag (10 pounds) for the next 5 days. It may take up to 3-5 days for your test results to become available to your physician. Call your physician if you have not heard anything after 7 business day. If you have any questions or concerns, please call [phone] and ask to speak to the nurse on-call.

## 2022-05-12 NOTE — PROGRESS NOTES
Patient ambulatory back to IR recovery at this time. Family member at bedside. Patient is A&Ox4, on RA, and is in NAD at this time. Patient has minor complaints of pain at this time in his L flank area. Call bell is within reach, bed locked, and lowered at this time. Patient awaiting to be consented for ordered procedure at this time.

## 2022-05-12 NOTE — PROGRESS NOTES
Name of Procedure: Image guided Retroperitoneal mass biopsy with moderate sedation    Sedation medications given: yes    Versed: 2 mg    Fentanyl: 75 mcg    Sedation Tolerated: well    Total Sedation Time: 25 minutes    Sedation Start: 08:35 a.m. Sedation End: 09:00 a.m. Vital Signs: stable (BP was high during procedure which MD Tigre Boswell was aware of, BP is now stable post procedure)    Fluids Removed: none    Samples sent to lab: samples sent with cytology     Any complications related to procedure: none    Pt still A&OX4 post procedure, on RA, and is in NAD at this time. Pt eating and drinking at this time. Pt's VSS at this time. Will continue to monitor pt until 09:45 and then pt can be d/c per MD Tigre Boswell.

## 2022-05-12 NOTE — PROGRESS NOTES
Patient ambulatory out to car at this time, wife driving pt home. Pt still A&OX4, on RA, and is in NAD at this time.

## 2022-05-12 NOTE — H&P
Radiology History and Physical    Patient: Eryn June 64 y.o. male       Chief Complaint: No chief complaint on file. History of Present Illness: 64year old male with a left retroperitoneal mass, presents for biopsy. History:    Past Medical History:   Diagnosis Date    Diabetes ([address]    [employer] Hypothyroid      Family History   Problem Relation Age of Onset    Breast Cancer Mother     Diabetes Father      Social History     Socioeconomic History    Marital status:      Spouse name: Not on file    Number of children: Not on file    Years of education: Not on file    Highest education level: Not on file   Occupational History    Not on file   Tobacco Use    Smoking status: Never Smoker    Smokeless tobacco: Never Used   Vaping Use    Vaping Use: Never used   Substance and Sexual Activity    Alcohol use: Yes     Alcohol/week: 2.0 standard drinks     Types: 1 Glasses of wine, 1 Cans of beer per week    Drug use: No    Sexual activity: Yes   Other Topics Concern    Not on file   Social History Narrative    Not on file     Social Determinants of Health     Financial Resource Strain:     Difficulty of Paying Living Expenses: Not on file   Food Insecurity:     Worried About Running Out of Food in the Last Year: Not on file    Mitchell of Food in the Last Year: Not on file   Transportation Needs:     Lack of Transportation (Medical): Not on file    Lack of Transportation (Non-Medical):  Not on file   Physical Activity:     Days of Exercise per Week: Not on file    Minutes of Exercise per Session: Not on file   Stress:     Feeling of Stress : Not on file   Social Connections:     Frequency of Communication with Friends and Family: Not on file    Frequency of Social Gatherings with Friends and Family: Not on file    Attends Muslim Services: Not on file    Active Member of Clubs or Organizations: Not on file    Attends Club or Organization Meetings: Not on file    Marital Status: Not on file   Intimate Partner Violence:     Fear of Current or Ex-Partner: Not on file    Emotionally Abused: Not on file    Physically Abused: Not on file    Sexually Abused: Not on file   Housing Stability:     Unable to Pay for Housing in the Last Year: Not on file    Number of Tae in the Last Year: Not on file    Unstable Housing in the Last Year: Not on file       Allergies: No Known Allergies    Current Medications:  Current Outpatient Medications   Medication Sig    glipiZIDE (GLUCOTROL) 10 mg tablet TAKE 1 TABLET  BY MOUTH  BEFORE BREAKFAST AND DINNER    semaglutide (Ozempic) 1 mg/dose (4 mg/3 mL) pnij     meloxicam (MOBIC) 15 mg tablet Take 15 mg by mouth daily.  triamcinolone acetonide (KENALOG) 0.1 % topical cream APPLY THIN LAYER TOPICALLY TO THE AFFECTED AREA THREE TIMES DAILY    ibuprofen (MOTRIN) 800 mg tablet TAKE 1 TABLET BY MOUTH EVERY 8 HOURS FOR UP TO 10 DAYS AS NEEDED FOR PAIN    lisinopriL (PRINIVIL, ZESTRIL) 10 mg tablet Take 1 Tablet by mouth daily. Indications: kidney protection in diabetes    Jardiance 25 mg tablet TAKE 1 TABLET BY MOUTH EVERY DAY BEFORE BREAKFAST (Patient taking differently: Take 25 mg by mouth daily.)    liraglutide (Victoza 3-Mario Alberto) 0.6 mg/0.1 mL (18 mg/3 mL) pnij INJECT 1.8 MILLIGRAM SUBCUTANEOUSLY DAILY    levothyroxine (SYNTHROID) 150 mcg tablet TAKE 1 TABLET BY MOUTH  DAILY BEFORE BREAKFAST (Patient taking differently: nightly.)    atorvastatin (LIPITOR) 20 mg tablet Take 20 mg by mouth nightly.  Insulin Needles, Disposable, (BD Ultra-Fine Micro Pen Needle) 32 gauge x 1/4\" ndle Use once with Victoza Pen once daily    glucose blood VI test strips (OneTouch Ultra Blue Test Strip) strip CHECK GLUCOSE 2 TIMES DAILY    loratadine (CLARITIN) 10 mg tablet Take 10 mg by mouth.  MULTIVIT-MINERALS/FA/LYCOPENE (ONE-A-DAY MEN'S PO) Take 1 Tablet by mouth daily.     metFORMIN (GLUCOPHAGE) 1,000 mg tablet Take 1,000 mg by mouth two (2) times daily (with meals). Current Facility-Administered Medications   Medication Dose Route Frequency    fentaNYL citrate (PF) injection 100 mcg  100 mcg IntraVENous Rad Multiple    midazolam (VERSED) injection 5 mg  5 mg IntraVENous Multiple    0.9% sodium chloride infusion  25 mL/hr IntraVENous CONTINUOUS        Physical Exam:  Blood pressure 129/69, pulse 62, temperature 98.1 °F (36.7 °C), resp. rate 18, height 6' (1.829 m), weight 100.7 kg (222 lb), SpO2 98 %. GENERAL: alert, cooperative, no distress, appears stated age,   LUNG: Nonlabored respiration on room air  HEART: regular rate and rhythm    Alerts:    Hospital Problems  Date Reviewed: 5/10/2022    None          Laboratory:    No results for input(s): HGB, HCT, WBC, PLT, INR, BUN, CREA, K, CRCLT, HGBEXT, HCTEXT, PLTEXT, INREXT in the last 72 hours. No lab exists for component: PTT, PT      Plan of Care/Planned Procedure:  Risks, benefits, and alternatives reviewed with patient and he agrees to proceed with the procedure. CT guided biopsy of left retroperitoneal mass    Deemed appropriate for moderate sedation with versed and fentanyl.     Alyssa Mccloud MD  Interventional Radiology  [facility]  8:29 AM, 5/12/2022

## 2022-05-17 ENCOUNTER — TELEPHONE (OUTPATIENT)
Dept: SURGERY | Age: 62
End: 2022-05-17

## 2022-05-17 DIAGNOSIS — C80.1 MALIGNANCY (HCC): Primary | ICD-10-CM

## 2022-05-18 ENCOUNTER — TELEPHONE (OUTPATIENT)
Dept: SURGERY | Age: 62
End: 2022-05-18

## 2022-05-18 ENCOUNTER — HOSPITAL ENCOUNTER (OUTPATIENT)
Dept: CT IMAGING | Age: 62
Discharge: HOME | End: 2022-05-18
Attending: SURGERY
Payer: COMMERCIAL

## 2022-05-18 DIAGNOSIS — C62.90 SEMINOMA (HCC): Primary | ICD-10-CM

## 2022-05-18 DIAGNOSIS — C80.1 MALIGNANCY (HCC): ICD-10-CM

## 2022-05-18 DIAGNOSIS — N50.89 MASS OF TESTIS: ICD-10-CM

## 2022-05-18 LAB — CREAT BLD-MCNC: 0.9 MG/DL (ref 0.6–1.3)

## 2022-05-18 PROCEDURE — 71260 CT THORAX DX C+: CPT

## 2022-05-18 PROCEDURE — 82565 ASSAY OF CREATININE: CPT

## 2022-05-18 PROCEDURE — 74011000636 HC RX REV CODE- 636: Performed by: SURGERY

## 2022-05-18 RX ADMIN — IOPAMIDOL 80 ML: 755 INJECTION, SOLUTION INTRAVENOUS at 08:28

## 2022-05-18 NOTE — TELEPHONE ENCOUNTER
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

## 2022-05-20 ENCOUNTER — ANESTHESIA EVENT (OUTPATIENT)
Dept: SURGERY | Age: 62
End: 2022-05-20
Payer: COMMERCIAL

## 2022-05-20 ENCOUNTER — TELEPHONE (OUTPATIENT)
Dept: SURGERY | Age: 62
End: 2022-05-20

## 2022-05-20 ENCOUNTER — HOSPITAL ENCOUNTER (OUTPATIENT)
Dept: ULTRASOUND IMAGING | Age: 62
Discharge: HOME | End: 2022-05-20
Attending: SURGERY
Payer: COMMERCIAL

## 2022-05-20 DIAGNOSIS — C62.90 SEMINOMA (HCC): ICD-10-CM

## 2022-05-20 DIAGNOSIS — N50.89 MASS OF TESTIS: ICD-10-CM

## 2022-05-20 PROCEDURE — 76870 US EXAM SCROTUM: CPT

## 2022-05-20 RX ORDER — LEVOTHYROXINE SODIUM 137 UG/1
137 TABLET ORAL
COMMUNITY

## 2022-05-20 NOTE — PERIOP NOTES
[facility]  Ambulatory Surgery Unit  Pre-operative Instructions    Surgery/Procedure Date  5/23            Tentative Arrival Time TBD      1. On the day of your surgery/procedure, please report to the Ambulatory Surgery Unit Registration Desk and sign in at your designated time. The Ambulatory Surgery Unit is located in [facility] on the [facility] side of the [facility] across from the [address] [place] [address]. Please have all of your health insurance cards and a photo ID.    **TWO adults may accompany you the day of the procedure. We have limited seating available. If our waiting room is at capacity, your ride may be asked to remain in their vehicle. No one under 15 is allowed in the waiting room. Masks, fully covering the mouth and nose, are required in the waiting room. 2. You must have someone with you to drive you home, as you should not drive a car for 24 hours following anesthesia. Please make arrangements for a responsible adult friend or family member to stay with you for at least the first 24 hours after your surgery. 3. Do not have anything to eat or drink (including water, gum, mints, coffee, juice) after 11:59 PM  5/22. This may not apply to medications prescribed by your physician. (Please note below the special instructions with medications to take the morning of surgery, if applicable.)    4. We recommend you do not drink any alcoholic beverages for 24 hours before and after your surgery. 5. Contact your surgeons office for instructions on the following medications: non-steroidal anti-inflammatory drugs (i.e. Advil, Aleve), vitamins, and supplements. (Some surgeons will want you to stop these medications prior to surgery and others may allow you to take them)   **If you are currently taking Plavix, Coumadin, Aspirin and/or other blood-thinning agents, contact your surgeon for instructions. ** Your surgeon will partner with the physician prescribing these medications to determine if it is safe to stop or if you need to continue taking. Please do not stop taking these medications without instructions from your surgeon. 6. In an effort to help prevent surgical site infection, we ask that you shower with an anti-bacterial soap (i.e. Dial/Safeguard, or the soap provided to you at your preadmission testing appointment) for 3 days prior to and on the morning of surgery, using a fresh towel after each shower. (Please begin this process with fresh bed linens.) Do not apply any lotions, powders, or deodorants after the shower on the day of your procedure. If applicable, please do not shave the operative site for 48 hours prior to surgery. 7. Wear comfortable clothes. Wear glasses instead of contacts. Do not bring any jewelry or money (other than copays or fees as instructed). Do not wear make-up, particularly mascara, the morning of your surgery. Do not wear nail polish, particularly if you are having foot /hand surgery. Wear your hair loose or down, no ponytails, buns, simone pins or clips. All body piercings must be removed. 8. You should understand that if you do not follow these instructions your surgery may be cancelled. If your physical condition changes (i.e. fever, cold or flu) please contact your surgeon as soon as possible. 9. It is important that you be on time. If a situation occurs where you may be late, or if you have any questions or problems, please call [phone].    10. Your surgery time may be subject to change. You will receive a phone call the day prior to surgery to confirm your arrival time. Special Instructions: Take all medications and inhalers, as prescribed, on the morning of surgery with a sip of water EXCEPT: diabetic medications      Insulin Dependent Diabetic patients: Take your diabetic medications as prescribed the day before surgery. Hold all diabetic medications the day of surgery.     If you are scheduled to arrive for surgery after 8:00 AM, and your AM blood sugar is >200, please call Ambulatory Surgery. I understand a pre-operative phone call will be made to verify my surgery time. In the event that I am not available, I give permission for a message to be left on my answering service and/or with another person?       yes      reviewed by phone with pt, verbalized understanding.   ___________________      ___________________      ________________  (Signature of Patient)          (Witness)                   (Date and Time)

## 2022-05-20 NOTE — TELEPHONE ENCOUNTER
Patient with seminoma    Needs port a cath with fluoro under MAC  Discussed risks and benefits and he wishes to proceed.     Maggie Aly MD FACS

## 2022-05-23 ENCOUNTER — ANESTHESIA (OUTPATIENT)
Dept: SURGERY | Age: 62
End: 2022-05-23
Payer: COMMERCIAL

## 2022-05-23 ENCOUNTER — HOSPITAL ENCOUNTER (OUTPATIENT)
Age: 62
Setting detail: OUTPATIENT SURGERY
Discharge: HOME | End: 2022-05-23
Attending: SURGERY | Admitting: SURGERY
Payer: COMMERCIAL

## 2022-05-23 ENCOUNTER — APPOINTMENT (OUTPATIENT)
Dept: GENERAL RADIOLOGY | Age: 62
End: 2022-05-23
Attending: SURGERY
Payer: COMMERCIAL

## 2022-05-23 VITALS
TEMPERATURE: 97.5 F | SYSTOLIC BLOOD PRESSURE: 114 MMHG | OXYGEN SATURATION: 100 % | HEIGHT: 72 IN | BODY MASS INDEX: 28.58 KG/M2 | RESPIRATION RATE: 10 BRPM | DIASTOLIC BLOOD PRESSURE: 67 MMHG | HEART RATE: 59 BPM | WEIGHT: 211 LBS

## 2022-05-23 DIAGNOSIS — C62.90 SEMINOMA (HCC): Primary | ICD-10-CM

## 2022-05-23 LAB
GLUCOSE BLD STRIP.AUTO-MCNC: 110 MG/DL (ref 65–117)
GLUCOSE BLD STRIP.AUTO-MCNC: 113 MG/DL (ref 65–117)
SERVICE CMNT-IMP: NORMAL
SERVICE CMNT-IMP: NORMAL

## 2022-05-23 PROCEDURE — 76060000061 HC AMB SURG ANES 0.5 TO 1 HR: Performed by: SURGERY

## 2022-05-23 PROCEDURE — 74011000250 HC RX REV CODE- 250: Performed by: SURGERY

## 2022-05-23 PROCEDURE — 2709999900 HC NON-CHARGEABLE SUPPLY: Performed by: SURGERY

## 2022-05-23 PROCEDURE — 77001 FLUOROGUIDE FOR VEIN DEVICE: CPT | Performed by: SURGERY

## 2022-05-23 PROCEDURE — 74011250636 HC RX REV CODE- 250/636: Performed by: SURGERY

## 2022-05-23 PROCEDURE — 76030000000 HC AMB SURG OR TIME 0.5 TO 1: Performed by: SURGERY

## 2022-05-23 PROCEDURE — 74011250636 HC RX REV CODE- 250/636: Performed by: ANESTHESIOLOGY

## 2022-05-23 PROCEDURE — 82962 GLUCOSE BLOOD TEST: CPT

## 2022-05-23 PROCEDURE — 77030031139 HC SUT VCRL2 J&J -A: Performed by: SURGERY

## 2022-05-23 PROCEDURE — 76000 FLUOROSCOPY <1 HR PHYS/QHP: CPT

## 2022-05-23 PROCEDURE — 77030040922 HC BLNKT HYPOTHRM STRY -A: Performed by: SURGERY

## 2022-05-23 PROCEDURE — 76210000050 HC AMBSU PH II REC 0.5 TO 1 HR: Performed by: SURGERY

## 2022-05-23 PROCEDURE — C1788 PORT, INDWELLING, IMP: HCPCS | Performed by: SURGERY

## 2022-05-23 PROCEDURE — 77030010507 HC ADH SKN DERMBND J&J -B: Performed by: SURGERY

## 2022-05-23 PROCEDURE — 71045 X-RAY EXAM CHEST 1 VIEW: CPT

## 2022-05-23 PROCEDURE — 77030002986 HC SUT PROL J&J -A: Performed by: SURGERY

## 2022-05-23 PROCEDURE — 76210000040 HC AMBSU PH I REC FIRST 0.5 HR: Performed by: SURGERY

## 2022-05-23 PROCEDURE — 36561 INSERT TUNNELED CV CATH: CPT | Performed by: SURGERY

## 2022-05-23 DEVICE — POWERPORT ISP IMPLANTABLE PORT WITH ATTACHABLE 8F CHRONOFLEX OPEN-ENDED SINGLE-LUMEN VENOUS CATHETER INTERMEDIATE KIT (WITH SUTURE PLUGS)
Type: IMPLANTABLE DEVICE | Site: CHEST | Status: FUNCTIONAL
Brand: POWERPORT, CHRONOFLEX

## 2022-05-23 RX ORDER — ONDANSETRON 2 MG/ML
4 INJECTION INTRAMUSCULAR; INTRAVENOUS AS NEEDED
Status: DISCONTINUED | OUTPATIENT
Start: 2022-05-23 | End: 2022-05-23 | Stop reason: HOSPADM

## 2022-05-23 RX ORDER — DIPHENHYDRAMINE HYDROCHLORIDE 50 MG/ML
12.5 INJECTION, SOLUTION INTRAMUSCULAR; INTRAVENOUS AS NEEDED
Status: DISCONTINUED | OUTPATIENT
Start: 2022-05-23 | End: 2022-05-23 | Stop reason: HOSPADM

## 2022-05-23 RX ORDER — LIDOCAINE HYDROCHLORIDE AND EPINEPHRINE 10; 10 MG/ML; UG/ML
INJECTION, SOLUTION INFILTRATION; PERINEURAL AS NEEDED
Status: DISCONTINUED | OUTPATIENT
Start: 2022-05-23 | End: 2022-05-23 | Stop reason: HOSPADM

## 2022-05-23 RX ORDER — OXYCODONE AND ACETAMINOPHEN 5; 325 MG/1; MG/1
1 TABLET ORAL
Status: DISCONTINUED | OUTPATIENT
Start: 2022-05-23 | End: 2022-05-23 | Stop reason: HOSPADM

## 2022-05-23 RX ORDER — SODIUM CHLORIDE, SODIUM LACTATE, POTASSIUM CHLORIDE, CALCIUM CHLORIDE 600; 310; 30; 20 MG/100ML; MG/100ML; MG/100ML; MG/100ML
25 INJECTION, SOLUTION INTRAVENOUS CONTINUOUS
Status: DISCONTINUED | OUTPATIENT
Start: 2022-05-23 | End: 2022-05-23 | Stop reason: HOSPADM

## 2022-05-23 RX ORDER — MIDAZOLAM HYDROCHLORIDE 1 MG/ML
INJECTION, SOLUTION INTRAMUSCULAR; INTRAVENOUS AS NEEDED
Status: DISCONTINUED | OUTPATIENT
Start: 2022-05-23 | End: 2022-05-23 | Stop reason: HOSPADM

## 2022-05-23 RX ORDER — FENTANYL CITRATE 50 UG/ML
INJECTION, SOLUTION INTRAMUSCULAR; INTRAVENOUS AS NEEDED
Status: DISCONTINUED | OUTPATIENT
Start: 2022-05-23 | End: 2022-05-23 | Stop reason: HOSPADM

## 2022-05-23 RX ORDER — PROPOFOL 10 MG/ML
INJECTION, EMULSION INTRAVENOUS
Status: DISCONTINUED | OUTPATIENT
Start: 2022-05-23 | End: 2022-05-23 | Stop reason: HOSPADM

## 2022-05-23 RX ORDER — SODIUM CHLORIDE 0.9 % (FLUSH) 0.9 %
5-40 SYRINGE (ML) INJECTION EVERY 8 HOURS
Status: DISCONTINUED | OUTPATIENT
Start: 2022-05-23 | End: 2022-05-23 | Stop reason: HOSPADM

## 2022-05-23 RX ORDER — PROPOFOL 10 MG/ML
INJECTION, EMULSION INTRAVENOUS AS NEEDED
Status: DISCONTINUED | OUTPATIENT
Start: 2022-05-23 | End: 2022-05-23 | Stop reason: HOSPADM

## 2022-05-23 RX ORDER — IBUPROFEN 800 MG/1
800 TABLET ORAL
Qty: 30 TABLET | Refills: 0 | Status: SHIPPED | OUTPATIENT
Start: 2022-05-23 | End: 2022-06-02

## 2022-05-23 RX ORDER — ONDANSETRON 2 MG/ML
INJECTION INTRAMUSCULAR; INTRAVENOUS AS NEEDED
Status: DISCONTINUED | OUTPATIENT
Start: 2022-05-23 | End: 2022-05-23 | Stop reason: HOSPADM

## 2022-05-23 RX ORDER — LIDOCAINE HYDROCHLORIDE 10 MG/ML
0.1 INJECTION, SOLUTION EPIDURAL; INFILTRATION; INTRACAUDAL; PERINEURAL AS NEEDED
Status: DISCONTINUED | OUTPATIENT
Start: 2022-05-23 | End: 2022-05-23 | Stop reason: HOSPADM

## 2022-05-23 RX ORDER — TRAMADOL HYDROCHLORIDE 50 MG/1
50 TABLET ORAL
Qty: 10 TABLET | Refills: 0 | Status: SHIPPED | OUTPATIENT
Start: 2022-05-23 | End: 2022-05-26

## 2022-05-23 RX ORDER — SODIUM CHLORIDE 0.9 % (FLUSH) 0.9 %
5-40 SYRINGE (ML) INJECTION AS NEEDED
Status: DISCONTINUED | OUTPATIENT
Start: 2022-05-23 | End: 2022-05-23 | Stop reason: HOSPADM

## 2022-05-23 RX ORDER — HYDROMORPHONE HYDROCHLORIDE 1 MG/ML
.2-.5 INJECTION, SOLUTION INTRAMUSCULAR; INTRAVENOUS; SUBCUTANEOUS ONCE
Status: DISCONTINUED | OUTPATIENT
Start: 2022-05-23 | End: 2022-05-23 | Stop reason: HOSPADM

## 2022-05-23 RX ORDER — MORPHINE SULFATE 10 MG/ML
2 INJECTION, SOLUTION INTRAMUSCULAR; INTRAVENOUS
Status: DISCONTINUED | OUTPATIENT
Start: 2022-05-23 | End: 2022-05-23 | Stop reason: HOSPADM

## 2022-05-23 RX ORDER — FENTANYL CITRATE 50 UG/ML
25 INJECTION, SOLUTION INTRAMUSCULAR; INTRAVENOUS
Status: DISCONTINUED | OUTPATIENT
Start: 2022-05-23 | End: 2022-05-23 | Stop reason: HOSPADM

## 2022-05-23 RX ADMIN — FENTANYL CITRATE 25 MCG: 50 INJECTION, SOLUTION INTRAMUSCULAR; INTRAVENOUS at 15:32

## 2022-05-23 RX ADMIN — SODIUM CHLORIDE, POTASSIUM CHLORIDE, SODIUM LACTATE AND CALCIUM CHLORIDE 25 ML/HR: 600; 310; 30; 20 INJECTION, SOLUTION INTRAVENOUS at 15:04

## 2022-05-23 RX ADMIN — PROPOFOL 40 MG: 10 INJECTION, EMULSION INTRAVENOUS at 15:16

## 2022-05-23 RX ADMIN — FENTANYL CITRATE 25 MCG: 50 INJECTION, SOLUTION INTRAMUSCULAR; INTRAVENOUS at 15:43

## 2022-05-23 RX ADMIN — ONDANSETRON HYDROCHLORIDE 4 MG: 2 INJECTION, SOLUTION INTRAMUSCULAR; INTRAVENOUS at 15:12

## 2022-05-23 RX ADMIN — MIDAZOLAM HYDROCHLORIDE 2 MG: 1 INJECTION, SOLUTION INTRAMUSCULAR; INTRAVENOUS at 15:12

## 2022-05-23 RX ADMIN — PROPOFOL 125 MCG/KG/MIN: 10 INJECTION, EMULSION INTRAVENOUS at 15:16

## 2022-05-23 RX ADMIN — FENTANYL CITRATE 25 MCG: 50 INJECTION, SOLUTION INTRAMUSCULAR; INTRAVENOUS at 15:13

## 2022-05-23 RX ADMIN — WATER 2 G: 1 INJECTION INTRAMUSCULAR; INTRAVENOUS; SUBCUTANEOUS at 15:13

## 2022-05-23 RX ADMIN — FENTANYL CITRATE 25 MCG: 50 INJECTION, SOLUTION INTRAMUSCULAR; INTRAVENOUS at 15:26

## 2022-05-23 NOTE — PERIOP NOTES
[facility] [place] [facility]  1960  [id number]    Situation:  Verbal report given from: RN and CRNA  Procedure: Procedure(s):  INSERTION PORT A CATH WITH FLUOROSCOPY    Background:    Preoperative diagnosis: SEMINOMA    Postoperative diagnosis: SEMINOMA    :  Dr. Danii Hale    Assistant(s): Circ-1: Jose Ivan RN  Scrub Tech-1: Vinh Tineo  Surg Asst-1: Maximiliano Su    Specimens: * No specimens in log *    Assessment:  Intra-procedure medications         Anesthesia gave intra-procedure sedation and medications, see anesthesia flow sheet     Intravenous fluids: LR@ KVO     Vital signs stable.  Pt denies pain or chill      Recommendation:    Permission to share finding with Wife : yes

## 2022-05-23 NOTE — PERIOP NOTES
Dr. Shavon Pack looked at chest xray and OK'd fluids and discharge. 1645 Pt scratching neck and left chest. Can see irritation from drape tape on left chest across pectoresis muscle. Not open but ODALYS Hylton RN agreed from drape tape most likely. Cleaned with soap and water and stressed not to scratch. [id number] Showed wife area and stressed use just a small amount of antibiotic ointment or given sterile petroleum ointment. Stressed to wife not near incision which did not have any irritation and call MD if opens up or concerns. Dr. Shavon Pack and Dr. Binh Abad aware. Discharged to car without incident. Stressed to wife not Mobic and Ibuprofen-either or. May use Tylenol.  Both state understanding

## 2022-05-23 NOTE — DISCHARGE INSTRUCTIONS
Discharge Instructions:  Port a cath Insertion  Dr. Parveen Holder    Call for appointment for follow up in 2 weeks [phone]    Activity:    Walk regularly. You may resume driving in 24 hours unless still requiring narcotics for pain. It is ok to use the arm on side of surgery but do not over do it. Work:    You may return to work in 3-7 days to light activity. No lifting more than 10 pounds for one week. Diet:    You may resume normal diet after 24 hours. Anesthesia and narcotics may cause nausea and vomiting. If persistent please call the office. Wound Care: You have a special dressing called Dermabond. It is okay to shower and let the water run over the incision but do not scrub the area or soak in a tub. If you have a small amount of drainage you may place a dry bandage over the wound and change it daily. If you experience a lot of drainage, develop redness around the wound, or a fever over 101 F occurs please call the office. Medications:    Resume home medications as indicated on the Medical Reconciliation form. Aspirin, Coumadin, and Plavix can be restarted on post operative day 2 if you were taking them preoperatively. Pain medications:  Non steroidal antiinflammatories seem to work best for post surgical pain. Try these first as prescribed. A narcotic prescription will also be given for breakthrough pain. Over the counter stool softeners and laxatives may be used if needed. Do not hesitate to call with questions or concerns. TO PREVENT AN INFECTION      1. [address] YOUR HANDS     To prevent infection, good handwashing is the most important thing you or your caregiver can do.  Wash your hands with soap and water or use the hand  we gave you before you touch any wounds. 2. SHOWER     Use the antibacterial soap we gave you when you take a shower.  Shower with this soap until your wounds are healed.        To reach all areas of your body, you may need someone to help you.  Dont forget to clean your belly button with every shower. 3.  USE CLEAN SHEETS     Use freshly cleaned sheets on your bed after surgery.  To keep the surgery site clean, do not allow pets to sleep with you while your wound is still healing. 4.  CONTROL YOUR BLOOD SUGAR     High blood sugars slow wound healing.  If you are diabetic, control your blood sugar levels before and after your surgery. TAKE NARCOTIC PAIN MEDICATIONS WITH FOOD     Narcotics tend to be constipating, we suggest taking a stool softener such as Colace or Miralax (follow package instructions). DO NOT DRIVE WHILE TAKING NARCOTIC PAIN MEDICATIONS. DO NOT TAKE SLEEPING MEDICATIONS OR ANTIANXIETY MEDICATIONS WHILE TAKING NARCOTIC PAIN MEDICATIONS,  ESPECIALLY THE NIGHT OF ANESTHESIA! CPAP PATIENTS BE SURE TO WEAR MACHINE WHENEVER NAPPING OR SLEEPING! DISCHARGE SUMMARY from Nurse    The following personal items collected during your admission are returned to you:   Dental Appliance: Dental Appliances: None  Vision: Visual Aid: None  Hearing Aid:    Jewelry: Jewelry: None  Clothing: Clothing: Footwear,Shorts,Shirt,Undergarments,With patient  Other Valuables: Other Valuables: None  Valuables sent to safe:        PATIENT INSTRUCTIONS:    After General Anesthesia or Intravenous Sedation, for 24 hours or while taking prescription Narcotics:        Someone should be with you for the next 24 hours. For your own safety, a responsible adult must drive you home. · Limit your activities  · Recommended activity: Rest today, up with assistance today. Do not climb stairs or shower unattended for the next 24 hours. · Please start with a soft bland diet and advance as tolerated (no nausea) to regular diet. · If you have a sore throat you should try the following: fluids, warm salt water gargles, or throat lozenges.  If it does not improve after several days please follow up with your primary physician. · Do not drive and operate hazardous machinery  · Do not make important personal or business decisions  · Do  not drink alcoholic beverages  · If you have not urinated within 8 hours after discharge, please contact your surgeon on call. Report the following to your surgeon:  · Excessive pain, swelling, redness or odor of or around the surgical area  · Temperature over 100.5  · Nausea and vomiting lasting longer than 4 hours or if unable to take medications  · Any signs of decreased circulation or nerve impairment to extremity: change in color, persistent  numbness, tingling, coldness or increase pain      · You will receive a Post Operative Call from one of the Recovery Room Nurses on the day after your surgery to check on you. It is very important for us to know how you are recovering after your surgery. If you have an issue or need to speak with someone, please call your surgeon, do not wait for the post operative call. · You may receive an e-mail or letter in the mail from [employer] regarding your experience with us in the Ambulatory Surgery Unit. Your feedback is valuable to us and we appreciate your participation in the survey. · If the above instructions are not adequate or you are having problems after your surgery, call the physician at their office number. · We wish you a speedy recovery ? What to do at Home:      *  Please give a list of your current medications to your Primary Care Provider. *  Please update this list whenever your medications are discontinued, doses are      changed, or new medications (including over-the-counter products) are added. *  Please carry medication information at all times in case of emergency situations. If you have not received your influenza and/or pneumococcal vaccine, please follow up with your primary care physician. The discharge information has been reviewed with the patient and caregiver.   The patient and caregiver verbalized understanding.

## 2022-05-23 NOTE — ANESTHESIA PREPROCEDURE EVALUATION
Relevant Problems   ENDOCRINE   (+) Non-insulin dependent type 2 diabetes mellitus (HCC)       Anesthetic History          Comments: Constipation      Review of Systems / Medical History  Patient summary reviewed, nursing notes reviewed and pertinent labs reviewed    Pulmonary  Within defined limits                 Neuro/Psych   Within defined limits           Cardiovascular  Within defined limits                Exercise tolerance: >4 METS     GI/Hepatic/Renal  Within defined limits              Endo/Other    Diabetes: type 2  Hypothyroidism: well controlled  Arthritis and cancer     Other Findings   Comments: Seminoma         Physical Exam    Airway  Mallampati: III  TM Distance: 4 - 6 cm  Neck ROM: normal range of motion   Mouth opening: Normal    Comments: beard Cardiovascular    Rhythm: regular  Rate: normal        Comments: bradycardia Dental    Dentition: Caps/crowns  Comments:  On molars   Pulmonary  Breath sounds clear to auscultation               Abdominal  GI exam deferred       Other Findings            Anesthetic Plan    ASA: 2  Anesthesia type: MAC            Anesthetic plan and risks discussed with: Patient      preop glucose 110

## 2022-05-23 NOTE — PERIOP NOTES
Permission received to review discharge instructions and discuss private health information with wife and will have someone with them after discharge     Pt. Does not wish for blanket warmer to be connected at this time. Patient states that family/friend will be with them for at least 24 hours following today's procedure.

## 2022-05-23 NOTE — ANESTHESIA POSTPROCEDURE EVALUATION
Procedure(s):  INSERTION PORT A CATH WITH FLUOROSCOPY.     MAC    Anesthesia Post Evaluation      Multimodal analgesia: multimodal analgesia used between 6 hours prior to anesthesia start to PACU discharge  Patient location during evaluation: PACU  Patient participation: complete - patient participated  Level of consciousness: awake and alert  Pain score: 0  Airway patency: patent  Anesthetic complications: no  Cardiovascular status: acceptable  Respiratory status: acceptable  Hydration status: acceptable  Comments: Postop CXR done  Post anesthesia nausea and vomiting:  none  Final Post Anesthesia Temperature Assessment:  Normothermia (36.0-37.5 degrees C)      INITIAL Post-op Vital signs:   Vitals Value Taken Time   /82 05/23/22 1615   Temp 36.4 °C (97.6 °F) 05/23/22 1555   Pulse 62 05/23/22 1615   Resp 14 05/23/22 1615   SpO2 100 % 05/23/22 1615

## 2022-05-23 NOTE — BRIEF OP NOTE
Brief Postoperative Note    Patient: Clinton Day  YOB: 1960  MRN: [id number]    Date of Procedure: 5/23/2022     Pre-Op Diagnosis: SEMINOMA    Post-Op Diagnosis: Same as preoperative diagnosis. Procedure(s):  INSERTION PORT A CATH WITH FLUOROSCOPY    Surgeon(s):  Sergio Hare MD    Surgical Assistant: Surg Asst-1: Arlin Tubbs    Anesthesia: MAC     Estimated Blood Loss (mL): Minimal    Complications: None    Specimens: * No specimens in log *     Implants:   Implant Name Type Inv.  Item Serial No.  Lot No. LRB No. Used Action   PORT INFUS 8FR POLYUR ATTCH CHRONOFLEX CATH TI ALEXIS FILL SUT - SN/A  PORT INFUS 8FR POLYUR ATTCH CHRONOFLEX CATH TI ALEXIS FILL SUT N/A OLED-T_WD [id number] Right 1 Implanted       Drains: * No LDAs found *    Findings: right subclavian vein approach    Electronically Signed by Alvarado Ogden MD on 5/23/2022 at 3:50 PM

## 2022-05-23 NOTE — INTERVAL H&P NOTE
Update History & Physical    The Patient's History and Physical of May 10,   2022 was reviewed with the patient and I examined the patient. There was no change. The surgical site was confirmed by the patient and me. Pathology was a seminoma. Plan:  The risk, benefits, expected outcome, and alternative to the recommended procedure have been discussed with the patient. Patient understands and wants to proceed with the procedure.     Electronically signed by Alva Head MD on 5/23/2022 at 2:37 PM

## 2022-05-24 NOTE — OP NOTES
[facility]  OPERATIVE REPORT    Name:  Christian Dumont  MR#:  [id number]  :  1960  ACCOUNT #:  [de-identified]  DATE OF SERVICE:  2022    PREOPERATIVE DIAGNOSIS:  Seminoma. POSTOPERATIVE DIAGNOSIS:  Seminoma. PROCEDURE PERFORMED:  Insertion of right subclavian vein, 8-Angolan PowerPort with supervision and interpretation by Radiology. SURGEON:  Jerel Peterson MD    ASSISTANT:  Stephan Bee. ANESTHESIA:  MAC.    COMPLICATIONS:  None. SPECIMENS REMOVED:  None. IMPLANTS:  Augustus Maricao Port-A-Cath ChronoFlex 8-Angolan titanium, lot [id number]. ESTIMATED BLOOD LOSS:  Minimal.    DRAINS:  None. FINDINGS:  Right subclavian vein approach. 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 He wished to undergo chemotherapy and he needs to have central access for that and now presents for that procedure. PROCEDURE:  The patient was taken to the operating room, placed on the operating table in the supine position, arms were tucked and padded at the side and the neck and chest were prepped and draped in the usual sterile fashion. After appropriate time-out and antibiotics were given, 1% lidocaine with epinephrine was infiltrated into the skin and subcutaneous tissues in the right brachial pectoral region. Then with the patient in the steep Trendelenburg, an 18-gauge needle was inserted in the subclavian vein on first pass and the wire was placed over utilizing Seldinger technique. The needle was removed and utilizing fluoroscopy, we assured that it was going down to the right atrium. We then made a transverse incision and a pocket created for the Port-A-Cath to sit in. Then an 8-Angolan dilator and peel-away sheath placed over the guidewire and the wire and the dilator were removed and the catheter placed through the peel-away sheath.     After utilizing fluoroscopic guidance, we ensured that it was at the cavoatrial junction and we removed the peel-away sheath and amputated off the catheter. The catheter was then attached to the port and then sewn down to the chest wall with interrupted 2-0 Prolene suture. The port was accessed with a 21-gauge Lockhart needle, and had excellent blood return, and was flushed with heparinized saline followed by 3 mL of concentrated heparin flush. Once that was completed, the port was deaccessed. Next, interrupted 4-0 Vicryl was used to close the deep tissues and deep dermis and running 4-0 Vicryl was used to close the skin, and Dermabond dressing was applied. Upon completion of the procedure, the needle, sponge, and instrument counts were correct x2. The patient had tolerated procedure well, and was brought to recovery room.         Fercho Butterfield MD [id number]  D:  05/23/2022 15:54  T:  05/23/2022 23:25  JOB #:  [id number]  CC:  MD Eufemia Rodriguez MD

## 2022-06-03 ENCOUNTER — TELEPHONE (OUTPATIENT)
Dept: SURGERY | Age: 62
End: 2022-06-03

## 2022-06-03 NOTE — TELEPHONE ENCOUNTER
Patient is s/p port a cath insertion on 5/23 has a c/o of lower back pain 8/10. He is calling to ask if there was any relation to that and his current pain. I advised patient I did not think so but I would let Dr Virginia Jules know. He is currently at the oncologist office to see what his opinion is.

## 2022-06-03 NOTE — TELEPHONE ENCOUNTER
Patient had a port put in last week and he is now experiencing back pain, please have Dr. Merlin Hoyle call.

## 2022-06-03 NOTE — TELEPHONE ENCOUNTER
Spoke with patient to let him know that his port placement is not causing his back pain. Patient voiced understanding with no further questions at this time.

## 2022-06-07 ENCOUNTER — OFFICE VISIT (OUTPATIENT)
Dept: SURGERY | Age: 62
End: 2022-06-07
Payer: COMMERCIAL

## 2022-06-07 VITALS
RESPIRATION RATE: 16 BRPM | DIASTOLIC BLOOD PRESSURE: 72 MMHG | OXYGEN SATURATION: 98 % | HEIGHT: 72 IN | TEMPERATURE: 97.3 F | BODY MASS INDEX: 28.58 KG/M2 | SYSTOLIC BLOOD PRESSURE: 124 MMHG | WEIGHT: 211 LBS | HEART RATE: 72 BPM

## 2022-06-07 DIAGNOSIS — Z09 POSTOPERATIVE EXAMINATION: Primary | ICD-10-CM

## 2022-06-07 PROCEDURE — 99024 POSTOP FOLLOW-UP VISIT: CPT | Performed by: SURGERY

## 2022-06-07 RX ORDER — HYDROCODONE BITARTRATE AND ACETAMINOPHEN 5; 325 MG/1; MG/1
TABLET ORAL
COMMUNITY
Start: 2022-05-31

## 2022-06-07 NOTE — PROGRESS NOTES
Identified pt with two pt identifiers(name and ). Reviewed record in preparation for visit and have obtained necessary documentation. All patient medications has been reviewed. Chief Complaint   Patient presents with    Surgical Follow-up     INSERTION RIGHT SIDED PORT A CATH WITH FLUOROSCOPY 22       Health Maintenance Due   Topic    Hepatitis C Screening     Pneumococcal 0-64 years (1 - PCV)    DTaP/Tdap/Td series (1 - Tdap)    Colorectal Cancer Screening Combo     Shingrix Vaccine Age 50> (1 of 2)    MICROALBUMIN Q1     Foot Exam Q1     Eye Exam Retinal or Dilated     A1C test (Diabetic or Prediabetic)     Lipid Screen     COVID-19 Vaccine (3 - Booster for Moderna series)       Vitals:    22 0936   BP: 124/72   Pulse: 72   Resp: 16   Temp: 97.3 °F (36.3 °C)   TempSrc: Temporal   SpO2: 98%   Weight: 95.7 kg (211 lb)   Height: 6' (1.829 m)   PainSc:   0 - No pain       4. Have you been to the ER, urgent care clinic since your last visit? Hospitalized since your last visit? No    5. Have you seen or consulted any other health care providers outside of the [address] [place] [address] since your last visit? Include any pap smears or colon screening. No      Patient is accompanied by self I have received verbal consent from Alicia Metzger to discuss any/all medical information while they are present in the room.

## 2022-06-07 NOTE — PROGRESS NOTES
Surgery  Follow up  Procedure: insertion of right SCV port  OR date:  5/23/2022  Path:  none    S I feel fine, had constipation after orchiectomy now better    Visit Vitals  /72 (BP 1 Location: Left upper arm, BP Patient Position: Sitting, BP Cuff Size: Adult)   Pulse 72   Temp 97.3 °F (36.3 °C) (Temporal)   Resp 16   Ht 6' (1.829 m)   Wt 95.7 kg (211 lb)   SpO2 98%   BMI 28.62 kg/m²       O Incisions healing well without infection      A/P Doing well   Proceed with therapy for the seminoma   RTC prn    Christian Suarez MD FACS

## 2022-07-06 ENCOUNTER — TELEPHONE (OUTPATIENT)
Dept: ENDOCRINOLOGY | Age: 62
End: 2022-07-06

## 2022-07-19 ENCOUNTER — TRANSCRIBE ORDER (OUTPATIENT)
Dept: SCHEDULING | Age: 62
End: 2022-07-19

## 2022-07-19 DIAGNOSIS — C62.12 MALIGNANT NEOPLASM OF DESCENDED LEFT TESTIS (HCC): Primary | ICD-10-CM

## 2022-09-06 ENCOUNTER — TRANSCRIBE ORDER (OUTPATIENT)
Dept: REGISTRATION | Age: 62
End: 2022-09-06

## 2022-09-06 ENCOUNTER — HOSPITAL ENCOUNTER (OUTPATIENT)
Dept: GENERAL RADIOLOGY | Age: 62
Discharge: HOME | End: 2022-09-06
Payer: COMMERCIAL

## 2022-09-06 DIAGNOSIS — M54.9 BACK PAIN: ICD-10-CM

## 2022-09-06 DIAGNOSIS — M54.9 BACK PAIN: Primary | ICD-10-CM

## 2022-09-06 DIAGNOSIS — C62.90 TESTICULAR CANCER (HCC): ICD-10-CM

## 2022-09-06 PROCEDURE — 71101 X-RAY EXAM UNILAT RIBS/CHEST: CPT

## 2022-10-07 ENCOUNTER — HOSPITAL ENCOUNTER (OUTPATIENT)
Dept: CT IMAGING | Age: 62
Discharge: HOME | End: 2022-10-07
Attending: INTERNAL MEDICINE
Payer: COMMERCIAL

## 2022-10-07 DIAGNOSIS — C62.12 MALIGNANT NEOPLASM OF DESCENDED LEFT TESTIS (HCC): ICD-10-CM

## 2022-10-07 PROCEDURE — 74177 CT ABD & PELVIS W/CONTRAST: CPT

## 2022-10-07 PROCEDURE — 82565 ASSAY OF CREATININE: CPT

## 2022-10-07 PROCEDURE — 74011000636 HC RX REV CODE- 636: Performed by: INTERNAL MEDICINE

## 2022-10-07 RX ADMIN — IOPAMIDOL 100 ML: 755 INJECTION, SOLUTION INTRAVENOUS at 13:35

## 2022-10-11 LAB — CREAT BLD-MCNC: 1.5 MG/DL (ref 0.6–1.3)

## 2022-10-21 ENCOUNTER — TRANSCRIBE ORDER (OUTPATIENT)
Dept: REGISTRATION | Age: 62
End: 2022-10-21

## 2022-10-21 ENCOUNTER — HOSPITAL ENCOUNTER (OUTPATIENT)
Dept: GENERAL RADIOLOGY | Age: 62
Discharge: HOME | End: 2022-10-21
Payer: COMMERCIAL

## 2022-10-21 DIAGNOSIS — R07.1 CHEST PAIN ON BREATHING: ICD-10-CM

## 2022-10-21 DIAGNOSIS — R07.1 CHEST PAIN ON BREATHING: Primary | ICD-10-CM

## 2022-10-21 DIAGNOSIS — M54.9 BACK PAIN: ICD-10-CM

## 2022-10-21 PROCEDURE — 71046 X-RAY EXAM CHEST 2 VIEWS: CPT

## 2022-12-12 ENCOUNTER — TRANSCRIBE ORDER (OUTPATIENT)
Dept: REGISTRATION | Age: 62
End: 2022-12-12

## 2022-12-12 ENCOUNTER — HOSPITAL ENCOUNTER (OUTPATIENT)
Dept: GENERAL RADIOLOGY | Age: 62
Discharge: HOME | End: 2022-12-12
Payer: COMMERCIAL

## 2022-12-12 DIAGNOSIS — C62.12 MALIGNANT NEOPLASM OF DESCENDED LEFT TESTIS (HCC): ICD-10-CM

## 2022-12-12 DIAGNOSIS — Z51.11 ENCOUNTER FOR ANTINEOPLASTIC CHEMOTHERAPY: ICD-10-CM

## 2022-12-12 DIAGNOSIS — B37.0 CANDIDAL STOMATITIS: ICD-10-CM

## 2022-12-12 DIAGNOSIS — C62.12 MALIGNANT NEOPLASM OF DESCENDED LEFT TESTIS (HCC): Primary | ICD-10-CM

## 2022-12-12 DIAGNOSIS — R11.2 NAUSEA WITH VOMITING: ICD-10-CM

## 2022-12-12 PROCEDURE — 71046 X-RAY EXAM CHEST 2 VIEWS: CPT

## 2022-12-16 ENCOUNTER — TRANSCRIBE ORDER (OUTPATIENT)
Dept: SCHEDULING | Age: 62
End: 2022-12-16

## 2022-12-16 DIAGNOSIS — C62.12 MALIGNANT NEOPLASM OF DESCENDED LEFT TESTIS (HCC): Primary | ICD-10-CM

## 2022-12-22 ENCOUNTER — TRANSCRIBE ORDER (OUTPATIENT)
Dept: SCHEDULING | Age: 62
End: 2022-12-22

## 2022-12-22 DIAGNOSIS — C62.12 MALIGNANT NEOPLASM OF DESCENDED LEFT TESTIS (HCC): Primary | ICD-10-CM

## 2023-02-15 ENCOUNTER — HOSPITAL ENCOUNTER (OUTPATIENT)
Dept: GENERAL RADIOLOGY | Age: 63
Discharge: HOME | End: 2023-02-15
Attending: INTERNAL MEDICINE
Payer: COMMERCIAL

## 2023-02-15 ENCOUNTER — HOSPITAL ENCOUNTER (OUTPATIENT)
Dept: CT IMAGING | Age: 63
Discharge: HOME | End: 2023-02-15
Attending: INTERNAL MEDICINE
Payer: COMMERCIAL

## 2023-02-15 ENCOUNTER — TRANSCRIBE ORDER (OUTPATIENT)
Dept: REGISTRATION | Age: 63
End: 2023-02-15

## 2023-02-15 DIAGNOSIS — R11.2 NAUSEA WITH VOMITING: ICD-10-CM

## 2023-02-15 DIAGNOSIS — Z51.11 ENCOUNTER FOR ANTINEOPLASTIC CHEMOTHERAPY: ICD-10-CM

## 2023-02-15 DIAGNOSIS — B37.0 CANDIDIASIS OF MOUTH: ICD-10-CM

## 2023-02-15 DIAGNOSIS — C62.12 MALIGNANT NEOPLASM OF DESCENDED LEFT TESTIS (HCC): ICD-10-CM

## 2023-02-15 DIAGNOSIS — C62.12 MALIGNANT NEOPLASM OF DESCENDED LEFT TESTIS (HCC): Primary | ICD-10-CM

## 2023-02-15 LAB — CREAT BLD-MCNC: 1.8 MG/DL (ref 0.6–1.3)

## 2023-02-15 PROCEDURE — 71046 X-RAY EXAM CHEST 2 VIEWS: CPT

## 2023-02-15 PROCEDURE — 74177 CT ABD & PELVIS W/CONTRAST: CPT

## 2023-02-15 PROCEDURE — 74011000250 HC RX REV CODE- 250: Performed by: INTERNAL MEDICINE

## 2023-02-15 PROCEDURE — 82565 ASSAY OF CREATININE: CPT

## 2023-02-15 PROCEDURE — 74011000636 HC RX REV CODE- 636: Performed by: INTERNAL MEDICINE

## 2023-02-15 RX ORDER — BARIUM SULFATE 20 MG/ML
900 SUSPENSION ORAL
Status: COMPLETED | OUTPATIENT
Start: 2023-02-15 | End: 2023-02-15

## 2023-02-15 RX ADMIN — BARIUM SULFATE 450 ML: 21 SUSPENSION ORAL at 15:00

## 2023-02-15 RX ADMIN — IOPAMIDOL 100 ML: 755 INJECTION, SOLUTION INTRAVENOUS at 15:00

## 2023-03-17 ENCOUNTER — OFFICE VISIT (OUTPATIENT)
Dept: SURGERY | Age: 63
End: 2023-03-17
Payer: COMMERCIAL

## 2023-03-17 VITALS
RESPIRATION RATE: 20 BRPM | BODY MASS INDEX: 29.53 KG/M2 | DIASTOLIC BLOOD PRESSURE: 76 MMHG | OXYGEN SATURATION: 99 % | HEART RATE: 60 BPM | HEIGHT: 72 IN | TEMPERATURE: 97.9 F | SYSTOLIC BLOOD PRESSURE: 139 MMHG | WEIGHT: 218 LBS

## 2023-03-17 DIAGNOSIS — C62.90 SEMINOMA (HCC): Primary | ICD-10-CM

## 2023-03-17 PROCEDURE — 36590 REMOVAL TUNNELED CV CATH: CPT | Performed by: SURGERY

## 2023-03-17 RX ORDER — LIDOCAINE HYDROCHLORIDE 10 MG/ML
10 INJECTION, SOLUTION EPIDURAL; INFILTRATION; INTRACAUDAL; PERINEURAL ONCE
Qty: 10 ML | Refills: 0
Start: 2023-03-17 | End: 2023-03-17

## 2023-03-17 NOTE — PROGRESS NOTES
[facility] [address] [facility]  OFFICE PROCEDURE PROGRESS NOTE        Chart reviewed for the following:   Violet ROSARIO LPN, have reviewed the History, Physical and updated the Allergic reactions for [facility] performed immediately prior to start of procedure:   Violet ROSARIO LPN, have performed the following reviews on [place] prior to the start of the procedure:            * Patient was identified by name and date of birth   * Agreement on procedure being performed was verified  * Risks and Benefits explained to the patient  * Procedure site verified and marked as necessary  * Patient was positioned for comfort  * Consent was signed and verified     Time: 1415      Date of procedure: 3/17/2023    Procedure performed by:  Archie Polk MD    Provider assisted by: Avery Chong LPN    Patient assisted by: self    How tolerated by patient: tolerated the procedure well with no complications    Post Procedural Pain Scale: 0 - No Hurt    Comments:  Ice pack given for comfort, Dr Paz Carrnaza instructed patient on care for incision

## 2023-03-17 NOTE — PROGRESS NOTES
Procedure Note    Pre OP Dx:  Seminoma, unneeded port a cath  Post OP Dx Seminoma, unneeded port a cath  Procedure Removal of right sided port a cath  Surgeon Lorena  Anesthesia 1% Lidocaine with epi  10 ml  EBL   Minimal  Pathology none    Procedure  After informed consent and time out, the right anterior chest was prepped with chlorhexidine and draped sterilely. Local anesthetic was injected into the skin and subcutaneous tissues around the prior port insertion site and the port. A 2 cm skin incision was made thru the prior scar and the port was sharply excised. Interrupted 4-0 vicryl was used to close the deep layers and deep dermis and a running 4-0 vicryl was utilized as a subcuticular closure. A sterile dressing was applied. The patient tolerated the procedure well.     Frida Casarez MD FACS

## 2023-03-17 NOTE — PROGRESS NOTES
Identified pt with two pt identifiers(name and ). Reviewed record in preparation for visit and have obtained necessary documentation. All patient medications has been reviewed. Chief Complaint   Patient presents with    Procedure     Port removal       Health Maintenance Due   Topic    Hepatitis C Screening     Pneumococcal 0-64 years (1 - PCV)    DTaP/Tdap/Td series (1 - Tdap)    Colorectal Cancer Screening Combo     Shingles Vaccine (1 of 2)    Diabetic Alb to Cr ratio (uACR) test     Foot Exam Q1     Eye Exam Retinal or Dilated     COVID-19 Vaccine (3 - Booster for Moderna series)    A1C test (Diabetic or Prediabetic)     Lipid Screen     Flu Vaccine (1)    Depression Screen        Vitals:    23 1339   Resp: 20   Temp: 97.9 °F (36.6 °C)   TempSrc: Temporal   SpO2: 99%   Weight: 98.9 kg (218 lb)   Height: 6' (1.829 m)   PainSc:   0 - No pain       4. Have you been to the ER, urgent care clinic since your last visit? Hospitalized since your last visit? No    5. Have you seen or consulted any other health care providers outside of the [address] [place] [address] since your last visit? Include any pap smears or colon screening. Yes When: Tia Benavides oncologist      Patient is accompanied by self I have received verbal consent from Oumar Abdi to discuss any/all medical information while they are present in the room.

## 2023-04-24 ENCOUNTER — TRANSCRIBE ORDER (OUTPATIENT)
Dept: REGISTRATION | Age: 63
End: 2023-04-24

## 2023-04-24 ENCOUNTER — HOSPITAL ENCOUNTER (OUTPATIENT)
Dept: GENERAL RADIOLOGY | Age: 63
Discharge: HOME | End: 2023-04-24
Payer: COMMERCIAL

## 2023-04-24 DIAGNOSIS — R11.2 NAUSEA WITH VOMITING: ICD-10-CM

## 2023-04-24 DIAGNOSIS — C62.12 MALIGNANT NEOPLASM OF DESCENDED LEFT TESTIS (HCC): Primary | ICD-10-CM

## 2023-04-24 DIAGNOSIS — Z51.11 ENCOUNTER FOR ANTINEOPLASTIC CHEMOTHERAPY: ICD-10-CM

## 2023-04-24 DIAGNOSIS — B37.0 CANDIDAL STOMATITIS: ICD-10-CM

## 2023-04-24 DIAGNOSIS — C62.12 MALIGNANT NEOPLASM OF DESCENDED LEFT TESTIS (HCC): ICD-10-CM

## 2023-04-24 PROCEDURE — 71046 X-RAY EXAM CHEST 2 VIEWS: CPT

## 2023-04-25 ENCOUNTER — TRANSCRIBE ORDER (OUTPATIENT)
Dept: SCHEDULING | Age: 63
End: 2023-04-25

## 2023-04-25 DIAGNOSIS — Z51.11 ENCOUNTER FOR ANTINEOPLASTIC CHEMOTHERAPY: ICD-10-CM

## 2023-04-25 DIAGNOSIS — R11.2 NAUSEA WITH VOMITING, UNSPECIFIED: ICD-10-CM

## 2023-04-25 DIAGNOSIS — B37.0 CANDIDAL STOMATITIS: ICD-10-CM

## 2023-04-25 DIAGNOSIS — C62.12 MALIGNANT NEOPLASM OF DESCENDED LEFT TESTIS (HCC): Primary | ICD-10-CM

## 2023-05-03 ENCOUNTER — TRANSCRIBE ORDERS (OUTPATIENT)
Facility: HOSPITAL | Age: 63
End: 2023-05-03

## 2023-05-03 DIAGNOSIS — C62.12 MALIGNANT NEOPLASM OF DESCENDED LEFT TESTIS (HCC): Primary | ICD-10-CM

## 2023-05-03 DIAGNOSIS — Z51.11 ENCOUNTER FOR ANTINEOPLASTIC CHEMOTHERAPY: ICD-10-CM

## 2023-06-20 ENCOUNTER — HOSPITAL ENCOUNTER (OUTPATIENT)
Facility: HOSPITAL | Age: 63
Discharge: HOME | End: 2023-06-23
Payer: COMMERCIAL

## 2023-06-20 DIAGNOSIS — Z51.11 ENCOUNTER FOR ANTINEOPLASTIC CHEMOTHERAPY: ICD-10-CM

## 2023-06-20 DIAGNOSIS — C62.12 MALIGNANT NEOPLASM OF DESCENDED LEFT TESTIS (HCC): ICD-10-CM

## 2023-06-20 DIAGNOSIS — B37.0 CANDIDAL STOMATITIS: ICD-10-CM

## 2023-06-20 DIAGNOSIS — R11.2 NAUSEA AND VOMITING, UNSPECIFIED VOMITING TYPE: ICD-10-CM

## 2023-06-20 LAB — CREAT BLD-MCNC: 1.8 MG/DL (ref 0.6–1.3)

## 2023-06-20 PROCEDURE — 74177 CT ABD & PELVIS W/CONTRAST: CPT

## 2023-06-20 PROCEDURE — 82565 ASSAY OF CREATININE: CPT

## 2023-06-20 PROCEDURE — 71046 X-RAY EXAM CHEST 2 VIEWS: CPT

## 2023-06-20 PROCEDURE — 6360000004 HC RX CONTRAST MEDICATION: Performed by: FAMILY MEDICINE

## 2023-06-20 RX ADMIN — IOPAMIDOL 100 ML: 755 INJECTION, SOLUTION INTRAVENOUS at 10:51

## 2023-08-10 NOTE — TELEPHONE ENCOUNTER
Patient scheduled for CT chest tomorrow at [facility].
Patient stated he received lab results via [employer]. Patient would like to speak with a nurse about interpretation of results.
bx confirms malignancy but further stains are pending    Not enough cells to determine lymphoma on flow cytometry but there were abnormal lymph cells on touch prep    Spoke with Dr Coni Moctezuma  Will order CT chest with contrast    Violet please assist with scheduling CT    Carlitos Welch MD FACS
Statement Selected

## 2023-09-20 ENCOUNTER — HOSPITAL ENCOUNTER (OUTPATIENT)
Facility: HOSPITAL | Age: 63
Discharge: HOME | End: 2023-09-23
Payer: COMMERCIAL

## 2023-09-20 ENCOUNTER — TRANSCRIBE ORDERS (OUTPATIENT)
Facility: HOSPITAL | Age: 63
End: 2023-09-20

## 2023-09-20 DIAGNOSIS — C62.12 MALIGNANT NEOPLASM OF LEFT DESCENDED TESTIS (HCC): Primary | ICD-10-CM

## 2023-09-20 DIAGNOSIS — B37.0 CANDIDAL STOMATITIS: ICD-10-CM

## 2023-09-20 DIAGNOSIS — Z51.11 ENCOUNTER FOR ANTINEOPLASTIC CHEMOTHERAPY: ICD-10-CM

## 2023-09-20 DIAGNOSIS — R11.2 NAUSEA AND VOMITING, UNSPECIFIED VOMITING TYPE: ICD-10-CM

## 2023-09-20 DIAGNOSIS — C62.12 MALIGNANT NEOPLASM OF LEFT DESCENDED TESTIS (HCC): ICD-10-CM

## 2023-09-20 PROCEDURE — 71046 X-RAY EXAM CHEST 2 VIEWS: CPT

## 2023-10-16 ENCOUNTER — HOSPITAL ENCOUNTER (OUTPATIENT)
Facility: HOSPITAL | Age: 63
Discharge: HOME | End: 2023-10-19
Attending: INTERNAL MEDICINE
Payer: COMMERCIAL

## 2023-10-16 DIAGNOSIS — N18.32 STAGE 3B CHRONIC KIDNEY DISEASE (HCC): ICD-10-CM

## 2023-10-16 PROCEDURE — 76770 US EXAM ABDO BACK WALL COMP: CPT

## 2023-11-03 ENCOUNTER — HOSPITAL ENCOUNTER (OUTPATIENT)
Facility: HOSPITAL | Age: 63
Discharge: HOME | End: 2023-11-03
Attending: INTERNAL MEDICINE
Payer: COMMERCIAL

## 2023-11-03 DIAGNOSIS — C62.12 MALIGNANT NEOPLASM OF LEFT DESCENDED TESTIS (HCC): ICD-10-CM

## 2023-11-03 DIAGNOSIS — Z51.11 ENCOUNTER FOR ANTINEOPLASTIC CHEMOTHERAPY: ICD-10-CM

## 2023-11-03 DIAGNOSIS — R11.2 NAUSEA AND VOMITING, UNSPECIFIED VOMITING TYPE: ICD-10-CM

## 2023-11-03 DIAGNOSIS — B37.0 CANDIDAL STOMATITIS: ICD-10-CM

## 2023-11-03 PROCEDURE — 74176 CT ABD & PELVIS W/O CONTRAST: CPT

## 2024-03-05 ENCOUNTER — TRANSCRIBE ORDERS (OUTPATIENT)
Facility: HOSPITAL | Age: 64
End: 2024-03-05

## 2024-03-05 DIAGNOSIS — C62.92 MALIGNANT NEOPLASM OF LEFT TESTIS, UNSPECIFIED WHETHER DESCENDED OR UNDESCENDED (HCC): Primary | ICD-10-CM

## 2024-03-05 DIAGNOSIS — Z80.3 FAMILY HISTORY OF BREAST CANCER: Primary | ICD-10-CM

## 2024-03-05 DIAGNOSIS — Z80.3 FAMILY HISTORY OF BREAST CANCER: ICD-10-CM

## 2024-03-05 DIAGNOSIS — N63.41 UNSPECIFIED LUMP IN RIGHT BREAST, SUBAREOLAR: ICD-10-CM

## 2024-03-14 ENCOUNTER — HOSPITAL ENCOUNTER (OUTPATIENT)
Facility: HOSPITAL | Age: 64
End: 2024-03-14
Attending: INTERNAL MEDICINE
Payer: COMMERCIAL

## 2024-03-14 ENCOUNTER — HOSPITAL ENCOUNTER (OUTPATIENT)
Facility: HOSPITAL | Age: 64
Discharge: HOME | End: 2024-03-14
Attending: INTERNAL MEDICINE
Payer: COMMERCIAL

## 2024-03-14 ENCOUNTER — TRANSCRIBE ORDERS (OUTPATIENT)
Facility: HOSPITAL | Age: 64
End: 2024-03-14

## 2024-03-14 VITALS — HEIGHT: 72 IN | WEIGHT: 218 LBS | BODY MASS INDEX: 29.53 KG/M2

## 2024-03-14 DIAGNOSIS — N63.41 UNSPECIFIED LUMP IN RIGHT BREAST, SUBAREOLAR: ICD-10-CM

## 2024-03-14 DIAGNOSIS — Z80.3 FAMILY HISTORY OF BREAST CANCER: ICD-10-CM

## 2024-03-14 DIAGNOSIS — C62.12 MALIGNANT NEOPLASM OF DESCENDED LEFT TESTIS (HCC): Primary | ICD-10-CM

## 2024-03-14 DIAGNOSIS — Z51.11 ENCOUNTER FOR ANTINEOPLASTIC CHEMOTHERAPY: ICD-10-CM

## 2024-03-14 DIAGNOSIS — C62.12 MALIGNANT NEOPLASM OF DESCENDED LEFT TESTIS (HCC): ICD-10-CM

## 2024-03-14 PROCEDURE — 76642 ULTRASOUND BREAST LIMITED: CPT

## 2024-03-14 PROCEDURE — 71046 X-RAY EXAM CHEST 2 VIEWS: CPT

## 2024-03-14 PROCEDURE — 77066 DX MAMMO INCL CAD BI: CPT

## 2024-05-06 ENCOUNTER — HOSPITAL ENCOUNTER (OUTPATIENT)
Facility: HOSPITAL | Age: 64
Discharge: HOME | End: 2024-05-09
Attending: INTERNAL MEDICINE
Payer: COMMERCIAL

## 2024-05-06 DIAGNOSIS — N63.41 UNSPECIFIED LUMP IN RIGHT BREAST, SUBAREOLAR: ICD-10-CM

## 2024-05-06 DIAGNOSIS — R11.2 NAUSEA AND VOMITING, UNSPECIFIED VOMITING TYPE: ICD-10-CM

## 2024-05-06 DIAGNOSIS — Z51.11 ENCOUNTER FOR ANTINEOPLASTIC CHEMOTHERAPY: ICD-10-CM

## 2024-05-06 DIAGNOSIS — C62.12 MALIGNANT NEOPLASM OF DESCENDED LEFT TESTIS (HCC): ICD-10-CM

## 2024-05-06 DIAGNOSIS — B37.0 CANDIDAL STOMATITIS: ICD-10-CM

## 2024-05-06 PROCEDURE — 74176 CT ABD & PELVIS W/O CONTRAST: CPT

## 2024-07-12 ENCOUNTER — TRANSCRIBE ORDERS (OUTPATIENT)
Facility: HOSPITAL | Age: 64
End: 2024-07-12

## 2024-07-12 ENCOUNTER — HOSPITAL ENCOUNTER (OUTPATIENT)
Facility: HOSPITAL | Age: 64
Discharge: HOME | End: 2024-07-12
Payer: COMMERCIAL

## 2024-07-12 DIAGNOSIS — Z51.11 ENCOUNTER FOR ANTINEOPLASTIC CHEMOTHERAPY: ICD-10-CM

## 2024-07-12 DIAGNOSIS — R11.2 NAUSEA AND VOMITING, UNSPECIFIED VOMITING TYPE: ICD-10-CM

## 2024-07-12 DIAGNOSIS — C62.12 MALIGNANT NEOPLASM OF DESCENDED LEFT TESTIS (HCC): Primary | ICD-10-CM

## 2024-07-12 DIAGNOSIS — B37.0 CANDIDIASIS OF MOUTH: ICD-10-CM

## 2024-07-12 DIAGNOSIS — N63.41 SUBAREOLAR MASS OF RIGHT BREAST: ICD-10-CM

## 2024-07-12 DIAGNOSIS — C62.12 MALIGNANT NEOPLASM OF DESCENDED LEFT TESTIS (HCC): ICD-10-CM

## 2024-07-12 PROCEDURE — 71046 X-RAY EXAM CHEST 2 VIEWS: CPT

## 2024-10-28 ENCOUNTER — HOSPITAL ENCOUNTER (OUTPATIENT)
Facility: HOSPITAL | Age: 64
Discharge: HOME | End: 2024-10-31
Payer: COMMERCIAL

## 2024-10-28 ENCOUNTER — TRANSCRIBE ORDERS (OUTPATIENT)
Facility: HOSPITAL | Age: 64
End: 2024-10-28

## 2024-10-28 DIAGNOSIS — B37.0 CANDIDAL STOMATITIS: ICD-10-CM

## 2024-10-28 DIAGNOSIS — N63.41 UNSPECIFIED LUMP IN RIGHT BREAST, SUBAREOLAR: ICD-10-CM

## 2024-10-28 DIAGNOSIS — C62.12 MALIGNANT NEOPLASM OF DESCENDED LEFT TESTIS (HCC): Primary | ICD-10-CM

## 2024-10-28 DIAGNOSIS — R11.2 NAUSEA AND VOMITING, UNSPECIFIED VOMITING TYPE: ICD-10-CM

## 2024-10-28 DIAGNOSIS — Z51.11 ENCOUNTER FOR ANTINEOPLASTIC CHEMOTHERAPY: ICD-10-CM

## 2024-10-28 DIAGNOSIS — C62.12 MALIGNANT NEOPLASM OF DESCENDED LEFT TESTIS (HCC): ICD-10-CM

## 2024-10-28 PROCEDURE — 71046 X-RAY EXAM CHEST 2 VIEWS: CPT

## 2024-10-31 ENCOUNTER — TRANSCRIBE ORDERS (OUTPATIENT)
Facility: HOSPITAL | Age: 64
End: 2024-10-31

## 2024-10-31 DIAGNOSIS — C62.12 MALIGNANT NEOPLASM OF DESCENDED LEFT TESTIS (HCC): Primary | ICD-10-CM

## 2024-10-31 DIAGNOSIS — C62.12 CANCER OF DESCENDED LEFT TESTIS (HCC): Primary | ICD-10-CM

## 2024-11-26 ENCOUNTER — HOSPITAL ENCOUNTER (OUTPATIENT)
Facility: HOSPITAL | Age: 64
Discharge: HOME | End: 2024-11-29
Attending: INTERNAL MEDICINE
Payer: COMMERCIAL

## 2024-11-26 DIAGNOSIS — C62.12 CANCER OF DESCENDED LEFT TESTIS (HCC): ICD-10-CM

## 2024-11-26 PROCEDURE — 74176 CT ABD & PELVIS W/O CONTRAST: CPT

## 2025-04-11 ENCOUNTER — TRANSCRIBE ORDERS (OUTPATIENT)
Facility: HOSPITAL | Age: 65
End: 2025-04-11

## 2025-04-11 DIAGNOSIS — N63.41 UNSPECIFIED LUMP IN RIGHT BREAST, SUBAREOLAR: ICD-10-CM

## 2025-04-11 DIAGNOSIS — R11.2 NAUSEA AND VOMITING, UNSPECIFIED VOMITING TYPE: ICD-10-CM

## 2025-04-11 DIAGNOSIS — C62.12 MALIGNANT NEOPLASM OF DESCENDED LEFT TESTIS (HCC): Primary | ICD-10-CM

## 2025-04-11 DIAGNOSIS — Z51.11 ENCOUNTER FOR ANTINEOPLASTIC CHEMOTHERAPY: ICD-10-CM

## 2025-04-11 DIAGNOSIS — B37.0 CANDIDAL STOMATITIS: ICD-10-CM

## 2025-04-17 ENCOUNTER — TRANSCRIBE ORDERS (OUTPATIENT)
Facility: HOSPITAL | Age: 65
End: 2025-04-17

## 2025-04-17 DIAGNOSIS — Z51.11 ENCOUNTER FOR ANTINEOPLASTIC CHEMOTHERAPY: ICD-10-CM

## 2025-04-17 DIAGNOSIS — N63.41 UNSPECIFIED LUMP IN RIGHT BREAST, SUBAREOLAR: ICD-10-CM

## 2025-04-17 DIAGNOSIS — C62.12 MALIGNANT NEOPLASM OF DESCENDED LEFT TESTIS (HCC): Primary | ICD-10-CM

## 2025-04-17 DIAGNOSIS — R11.2 NAUSEA AND VOMITING, UNSPECIFIED VOMITING TYPE: ICD-10-CM

## 2025-04-17 DIAGNOSIS — B37.0 CANDIDAL STOMATITIS: ICD-10-CM

## (undated) DEVICE — CATHETER ETER IV 20GA L125IN POLYUR STR RADPQ INTROCAN SFTY

## (undated) DEVICE — ICE PK EYE 4 1/2INX10IN (15/BX 2BX/CS

## (undated) DEVICE — REM POLYHESIVE ADULT PATIENT RETURN ELECTRODE: Brand: VALLEYLAB

## (undated) DEVICE — Device

## (undated) DEVICE — KENDALL DL 5 LEADS DUAL CONNECT SYSTEM BLENDED CASE - SINGLE-PATIENT-USE: Brand: SUSTAINABLE TECHNOLOGIES

## (undated) DEVICE — TOWEL SURG W17XL27IN STD BLU COT NONFENESTRATED PREWASHED

## (undated) DEVICE — Device: Brand: JELCO

## (undated) DEVICE — DECANTER BAG 9": Brand: MEDLINE INDUSTRIES, INC.

## (undated) DEVICE — SOLUTION IRRIG 1000ML 0.9% SOD CHL USP POUR PLAS BTL

## (undated) DEVICE — SOL INJ L R 1000ML BG --

## (undated) DEVICE — ROCKER SWITCH PENCIL BLADE ELECTRODE, HOLSTER: Brand: EDGE

## (undated) DEVICE — SYR 10ML LUER LOK 1/5ML GRAD --

## (undated) DEVICE — SUTURE PROL 2-0 L48IN NONABSORBABLE BLU SH L26MM 1/2 CIR 8533H

## (undated) DEVICE — SHEET, T, LAPAROTOMY, STERILE: Brand: MEDLINE

## (undated) DEVICE — GOWN,SIRUS,NONRNF,SETINSLV,2XL,18/CS: Brand: MEDLINE

## (undated) DEVICE — KENDALL DL ECG CABLE AND LEAD WIRE SYSTEM, 3-LEAD, SINGLE PATIENT USE: Brand: KENDALL

## (undated) DEVICE — 3M™ TEGADERM™ TRANSPARENT FILM DRESSING FRAME STYLE, 1624W, 2-3/8 IN X 2-3/4 IN (6 CM X 7 CM), 100/CT 4CT/CASE: Brand: 3M™ TEGADERM™

## (undated) DEVICE — PREP SKN CHLRAPRP APL 26ML STR --

## (undated) DEVICE — YANKAUER,BULB TIP,W/O VENT,RIGID,STERILE: Brand: MEDLINE

## (undated) DEVICE — SPONGE LAP 18X18IN STRL -- 5/PK

## (undated) DEVICE — SYRINGE IRRIG 60ML SFT PLIABLE BLB EZ TO GRP 1 HND USE W/

## (undated) DEVICE — SUTURE VCRL SZ 4-0 L27IN ABSRB UD L19MM PS-2 3/8 CIR PRIM J426H

## (undated) DEVICE — HYPODERMIC SAFETY NEEDLE: Brand: MONOJECT

## (undated) DEVICE — C-ARM: Brand: UNBRANDED

## (undated) DEVICE — BLADE ASSEMB CLP HAIR FINE --

## (undated) DEVICE — DERMABOND SKIN ADH 0.7ML -- DERMABOND ADVANCED 12/BX

## (undated) DEVICE — COUNTER NDL 20 COUNT HLD 40 DBL MAG ADH

## (undated) DEVICE — GLOVE ORANGE PI 7 1/2   MSG9075

## (undated) DEVICE — SPONGE GZ W4XL4IN COT RADPQ HIGHLY ABSRB

## (undated) DEVICE — SUTURE VCRL SZ 3-0 L27IN ABSRB UD L26MM SH 1/2 CIR J416H

## (undated) DEVICE — BLANKET WRM AD PREM MISTRAL-AIR

## (undated) DEVICE — MINOR BASIN -SMH: Brand: MEDLINE INDUSTRIES, INC.

## (undated) DEVICE — MARKER,SKIN,WI/RULER AND LABELS: Brand: MEDLINE

## (undated) DEVICE — TUBING, SUCTION, 1/4" X 10', STRAIGHT: Brand: MEDLINE

## (undated) DEVICE — NEEDLE HYPO 25GA L1.5IN BVL ORIENTED ECLIPSE

## (undated) DEVICE — SOL INJ SOD CL 0.9% 500ML BG --

## (undated) DEVICE — SYR 20ML LL STRL LF --

## (undated) DEVICE — CONTINU-FLO SOLUTION SET, 2 INJECTION SITES, MALE LUER LOCK ADAPTER WITH RETRACTABLE COLLAR, LARGE BORE STOPCOCK WITH ROTATING MALE LUER LOCK EXTENSION SET, 2 INJECTION SITES, MALE LUER LOCK ADAPTER WITH RETRACTABLE COLLAR: Brand: INTERLINK/CONTINU-FLO